# Patient Record
Sex: MALE | Race: BLACK OR AFRICAN AMERICAN | Employment: FULL TIME | URBAN - METROPOLITAN AREA
[De-identification: names, ages, dates, MRNs, and addresses within clinical notes are randomized per-mention and may not be internally consistent; named-entity substitution may affect disease eponyms.]

---

## 2017-01-16 ENCOUNTER — OFFICE VISIT (OUTPATIENT)
Dept: FAMILY MEDICINE CLINIC | Age: 57
End: 2017-01-16

## 2017-01-16 VITALS
SYSTOLIC BLOOD PRESSURE: 128 MMHG | HEART RATE: 77 BPM | HEIGHT: 66 IN | WEIGHT: 315 LBS | DIASTOLIC BLOOD PRESSURE: 80 MMHG | RESPIRATION RATE: 16 BRPM | BODY MASS INDEX: 50.62 KG/M2 | OXYGEN SATURATION: 93 %

## 2017-01-16 DIAGNOSIS — R79.83 HOMOCYSTEINEMIA: ICD-10-CM

## 2017-01-16 DIAGNOSIS — J01.91 ACUTE RECURRENT SINUSITIS, UNSPECIFIED LOCATION: ICD-10-CM

## 2017-01-16 DIAGNOSIS — Z51.81 MEDICATION MONITORING ENCOUNTER: ICD-10-CM

## 2017-01-16 DIAGNOSIS — E78.2 HYPERLIPIDEMIA, MIXED: Chronic | ICD-10-CM

## 2017-01-16 DIAGNOSIS — Z23 NEED FOR PROPHYLACTIC VACCINATION AGAINST STREPTOCOCCUS PNEUMONIAE (PNEUMOCOCCUS): ICD-10-CM

## 2017-01-16 DIAGNOSIS — I10 HTN (HYPERTENSION), BENIGN: Chronic | ICD-10-CM

## 2017-01-16 LAB
ANION GAP SERPL CALCULATED.3IONS-SCNC: 18 MMOL/L (ref 3–16)
BUN BLDV-MCNC: 21 MG/DL (ref 7–20)
CALCIUM SERPL-MCNC: 10 MG/DL (ref 8.3–10.6)
CHLORIDE BLD-SCNC: 97 MMOL/L (ref 99–110)
CO2: 23 MMOL/L (ref 21–32)
CREAT SERPL-MCNC: 1.4 MG/DL (ref 0.9–1.3)
GFR AFRICAN AMERICAN: >60
GFR NON-AFRICAN AMERICAN: 52
GLUCOSE BLD-MCNC: 106 MG/DL (ref 70–99)
HBA1C MFR BLD: 8.5 %
POTASSIUM SERPL-SCNC: 4.7 MMOL/L (ref 3.5–5.1)
SODIUM BLD-SCNC: 138 MMOL/L (ref 136–145)

## 2017-01-16 PROCEDURE — 3045F PR MOST RECENT HEMOGLOBIN A1C LEVEL 7.0-9.0%: CPT | Performed by: FAMILY MEDICINE

## 2017-01-16 PROCEDURE — G8427 DOCREV CUR MEDS BY ELIG CLIN: HCPCS | Performed by: FAMILY MEDICINE

## 2017-01-16 PROCEDURE — G8484 FLU IMMUNIZE NO ADMIN: HCPCS | Performed by: FAMILY MEDICINE

## 2017-01-16 PROCEDURE — 99215 OFFICE O/P EST HI 40 MIN: CPT | Performed by: FAMILY MEDICINE

## 2017-01-16 PROCEDURE — 83036 HEMOGLOBIN GLYCOSYLATED A1C: CPT | Performed by: FAMILY MEDICINE

## 2017-01-16 PROCEDURE — 36415 COLL VENOUS BLD VENIPUNCTURE: CPT | Performed by: FAMILY MEDICINE

## 2017-01-16 PROCEDURE — 90732 PPSV23 VACC 2 YRS+ SUBQ/IM: CPT | Performed by: FAMILY MEDICINE

## 2017-01-16 PROCEDURE — 3017F COLORECTAL CA SCREEN DOC REV: CPT | Performed by: FAMILY MEDICINE

## 2017-01-16 PROCEDURE — 1036F TOBACCO NON-USER: CPT | Performed by: FAMILY MEDICINE

## 2017-01-16 PROCEDURE — G8419 CALC BMI OUT NRM PARAM NOF/U: HCPCS | Performed by: FAMILY MEDICINE

## 2017-01-16 PROCEDURE — 90471 IMMUNIZATION ADMIN: CPT | Performed by: FAMILY MEDICINE

## 2017-01-16 RX ORDER — SIMVASTATIN 80 MG
80 TABLET ORAL NIGHTLY
Qty: 90 TABLET | Refills: 0 | Status: SHIPPED | OUTPATIENT
Start: 2017-01-16 | End: 2017-06-05 | Stop reason: SDUPTHER

## 2017-01-16 RX ORDER — FOLIC ACID 1 MG/1
1 TABLET ORAL DAILY
Qty: 90 TABLET | Refills: 0 | Status: SHIPPED | OUTPATIENT
Start: 2017-01-16 | End: 2017-06-05 | Stop reason: SDUPTHER

## 2017-01-16 RX ORDER — METFORMIN HYDROCHLORIDE 750 MG/1
TABLET, EXTENDED RELEASE ORAL
Qty: 180 TABLET | Refills: 0 | Status: SHIPPED | OUTPATIENT
Start: 2017-01-16 | End: 2017-06-05 | Stop reason: SDUPTHER

## 2017-01-16 RX ORDER — TRIAMTERENE AND HYDROCHLOROTHIAZIDE 75; 50 MG/1; MG/1
TABLET ORAL
Qty: 90 TABLET | Refills: 0 | Status: CANCELLED | OUTPATIENT
Start: 2017-01-16

## 2017-01-16 RX ORDER — TRIAMTERENE AND HYDROCHLOROTHIAZIDE 37.5; 25 MG/1; MG/1
1 TABLET ORAL DAILY
Qty: 90 TABLET | Refills: 0 | Status: SHIPPED | OUTPATIENT
Start: 2017-01-16 | End: 2017-06-05 | Stop reason: SDUPTHER

## 2017-01-16 RX ORDER — LISINOPRIL 40 MG/1
TABLET ORAL
Qty: 90 TABLET | Refills: 0 | Status: SHIPPED | OUTPATIENT
Start: 2017-01-16 | End: 2017-06-05 | Stop reason: SDUPTHER

## 2017-01-16 RX ORDER — SULFAMETHOXAZOLE AND TRIMETHOPRIM 800; 160 MG/1; MG/1
1 TABLET ORAL 2 TIMES DAILY
Qty: 20 TABLET | Refills: 0 | Status: SHIPPED | OUTPATIENT
Start: 2017-01-16 | End: 2017-01-26

## 2017-01-16 ASSESSMENT — PATIENT HEALTH QUESTIONNAIRE - PHQ9
SUM OF ALL RESPONSES TO PHQ QUESTIONS 1-9: 0
2. FEELING DOWN, DEPRESSED OR HOPELESS: 0
SUM OF ALL RESPONSES TO PHQ9 QUESTIONS 1 & 2: 0
1. LITTLE INTEREST OR PLEASURE IN DOING THINGS: 0

## 2017-06-05 DIAGNOSIS — E78.2 HYPERLIPIDEMIA, MIXED: Chronic | ICD-10-CM

## 2017-06-05 DIAGNOSIS — I10 HTN (HYPERTENSION), BENIGN: Chronic | ICD-10-CM

## 2017-06-05 DIAGNOSIS — R79.83 HOMOCYSTEINEMIA: ICD-10-CM

## 2017-06-05 RX ORDER — SIMVASTATIN 80 MG
TABLET ORAL
Qty: 90 TABLET | Refills: 3 | Status: SHIPPED | OUTPATIENT
Start: 2017-06-05 | End: 2018-06-04 | Stop reason: SDUPTHER

## 2017-06-05 RX ORDER — LISINOPRIL 40 MG/1
TABLET ORAL
Qty: 30 TABLET | Refills: 0 | Status: SHIPPED | OUTPATIENT
Start: 2017-06-05 | End: 2017-06-19 | Stop reason: SDUPTHER

## 2017-06-05 RX ORDER — TRIAMTERENE AND HYDROCHLOROTHIAZIDE 37.5; 25 MG/1; MG/1
TABLET ORAL
Qty: 30 TABLET | Refills: 0 | Status: SHIPPED | OUTPATIENT
Start: 2017-06-05 | End: 2017-06-19 | Stop reason: SDUPTHER

## 2017-06-05 RX ORDER — METFORMIN HYDROCHLORIDE 750 MG/1
TABLET, EXTENDED RELEASE ORAL
Qty: 60 TABLET | Refills: 0 | Status: SHIPPED | OUTPATIENT
Start: 2017-06-05 | End: 2017-06-19 | Stop reason: SDUPTHER

## 2017-06-05 RX ORDER — FOLIC ACID 1 MG/1
TABLET ORAL
Qty: 30 TABLET | Refills: 0 | Status: SHIPPED | OUTPATIENT
Start: 2017-06-05 | End: 2017-06-19 | Stop reason: SDUPTHER

## 2017-06-19 ENCOUNTER — OFFICE VISIT (OUTPATIENT)
Dept: FAMILY MEDICINE CLINIC | Age: 57
End: 2017-06-19

## 2017-06-19 VITALS
HEIGHT: 66 IN | WEIGHT: 315 LBS | SYSTOLIC BLOOD PRESSURE: 120 MMHG | HEART RATE: 72 BPM | DIASTOLIC BLOOD PRESSURE: 74 MMHG | BODY MASS INDEX: 50.62 KG/M2 | OXYGEN SATURATION: 96 % | RESPIRATION RATE: 16 BRPM

## 2017-06-19 DIAGNOSIS — I10 HTN (HYPERTENSION), BENIGN: Primary | Chronic | ICD-10-CM

## 2017-06-19 DIAGNOSIS — E79.0 ELEVATED URIC ACID IN BLOOD: Chronic | ICD-10-CM

## 2017-06-19 DIAGNOSIS — R79.83 HOMOCYSTEINEMIA: ICD-10-CM

## 2017-06-19 DIAGNOSIS — N18.2 CHRONIC KIDNEY DISEASE, STAGE II (MILD): Chronic | ICD-10-CM

## 2017-06-19 DIAGNOSIS — Z11.4 SCREENING FOR HIV WITHOUT PRESENCE OF RISK FACTORS: ICD-10-CM

## 2017-06-19 DIAGNOSIS — R60.0 EDEMA OF BOTH LEGS: Chronic | ICD-10-CM

## 2017-06-19 DIAGNOSIS — Z11.59 NEED FOR HEPATITIS C SCREENING TEST: ICD-10-CM

## 2017-06-19 LAB
ANION GAP SERPL CALCULATED.3IONS-SCNC: 13 MMOL/L (ref 3–16)
BUN BLDV-MCNC: 23 MG/DL (ref 7–20)
CALCIUM SERPL-MCNC: 10.3 MG/DL (ref 8.3–10.6)
CHLORIDE BLD-SCNC: 100 MMOL/L (ref 99–110)
CO2: 27 MMOL/L (ref 21–32)
CREAT SERPL-MCNC: 1.2 MG/DL (ref 0.9–1.3)
GFR AFRICAN AMERICAN: >60
GFR NON-AFRICAN AMERICAN: >60
GLUCOSE BLD-MCNC: 120 MG/DL (ref 70–99)
HBA1C MFR BLD: 8 %
HEPATITIS C ANTIBODY INTERPRETATION: NORMAL
POTASSIUM SERPL-SCNC: 4.6 MMOL/L (ref 3.5–5.1)
SODIUM BLD-SCNC: 140 MMOL/L (ref 136–145)

## 2017-06-19 PROCEDURE — 99215 OFFICE O/P EST HI 40 MIN: CPT | Performed by: FAMILY MEDICINE

## 2017-06-19 PROCEDURE — G8417 CALC BMI ABV UP PARAM F/U: HCPCS | Performed by: FAMILY MEDICINE

## 2017-06-19 PROCEDURE — G8427 DOCREV CUR MEDS BY ELIG CLIN: HCPCS | Performed by: FAMILY MEDICINE

## 2017-06-19 PROCEDURE — 3046F HEMOGLOBIN A1C LEVEL >9.0%: CPT | Performed by: FAMILY MEDICINE

## 2017-06-19 PROCEDURE — 1036F TOBACCO NON-USER: CPT | Performed by: FAMILY MEDICINE

## 2017-06-19 PROCEDURE — 36415 COLL VENOUS BLD VENIPUNCTURE: CPT | Performed by: FAMILY MEDICINE

## 2017-06-19 PROCEDURE — 83036 HEMOGLOBIN GLYCOSYLATED A1C: CPT | Performed by: FAMILY MEDICINE

## 2017-06-19 PROCEDURE — 3017F COLORECTAL CA SCREEN DOC REV: CPT | Performed by: FAMILY MEDICINE

## 2017-06-19 RX ORDER — METFORMIN HYDROCHLORIDE 750 MG/1
TABLET, EXTENDED RELEASE ORAL
Qty: 180 TABLET | Refills: 3 | Status: SHIPPED | OUTPATIENT
Start: 2017-06-19 | End: 2017-09-18 | Stop reason: SDUPTHER

## 2017-06-19 RX ORDER — TRIAMTERENE AND HYDROCHLOROTHIAZIDE 37.5; 25 MG/1; MG/1
TABLET ORAL
Qty: 90 TABLET | Refills: 0 | Status: SHIPPED | OUTPATIENT
Start: 2017-06-19 | End: 2017-11-01 | Stop reason: SDUPTHER

## 2017-06-19 RX ORDER — FOLIC ACID 1 MG/1
TABLET ORAL
Qty: 90 TABLET | Refills: 3 | Status: SHIPPED | OUTPATIENT
Start: 2017-06-19 | End: 2018-06-04 | Stop reason: SDUPTHER

## 2017-06-19 RX ORDER — LISINOPRIL 40 MG/1
TABLET ORAL
Qty: 90 TABLET | Refills: 0 | Status: SHIPPED | OUTPATIENT
Start: 2017-06-19 | End: 2017-07-10 | Stop reason: SDUPTHER

## 2017-06-19 ASSESSMENT — ENCOUNTER SYMPTOMS
CHEST TIGHTNESS: 0
WHEEZING: 0
SHORTNESS OF BREATH: 0
APNEA: 1
CHOKING: 0
COUGH: 1

## 2017-06-20 LAB — HIV-1 AND HIV-2 ANTIBODIES: NORMAL

## 2017-07-10 DIAGNOSIS — I10 HTN (HYPERTENSION), BENIGN: Chronic | ICD-10-CM

## 2017-07-10 RX ORDER — LISINOPRIL 40 MG/1
TABLET ORAL
Qty: 90 TABLET | Refills: 0 | Status: SHIPPED | OUTPATIENT
Start: 2017-07-10 | End: 2017-11-01 | Stop reason: SDUPTHER

## 2017-09-18 ENCOUNTER — OFFICE VISIT (OUTPATIENT)
Dept: FAMILY MEDICINE CLINIC | Age: 57
End: 2017-09-18

## 2017-09-18 ENCOUNTER — TELEPHONE (OUTPATIENT)
Dept: FAMILY MEDICINE CLINIC | Age: 57
End: 2017-09-18

## 2017-09-18 VITALS
BODY MASS INDEX: 50.62 KG/M2 | DIASTOLIC BLOOD PRESSURE: 76 MMHG | WEIGHT: 315 LBS | HEIGHT: 66 IN | HEART RATE: 74 BPM | OXYGEN SATURATION: 98 % | RESPIRATION RATE: 16 BRPM | SYSTOLIC BLOOD PRESSURE: 128 MMHG

## 2017-09-18 DIAGNOSIS — I10 HTN (HYPERTENSION), BENIGN: Chronic | ICD-10-CM

## 2017-09-18 DIAGNOSIS — G47.33 OSA ON CPAP: Chronic | ICD-10-CM

## 2017-09-18 DIAGNOSIS — N18.2 CHRONIC KIDNEY DISEASE, STAGE II (MILD): Chronic | ICD-10-CM

## 2017-09-18 DIAGNOSIS — E78.2 HYPERLIPIDEMIA, MIXED: Chronic | ICD-10-CM

## 2017-09-18 DIAGNOSIS — Z23 NEED FOR DIPHTHERIA-TETANUS-PERTUSSIS (TDAP) VACCINE, ADULT/ADOLESCENT: ICD-10-CM

## 2017-09-18 DIAGNOSIS — E55.9 VITAMIN D DEFICIENCY: ICD-10-CM

## 2017-09-18 DIAGNOSIS — Z51.81 MEDICATION MONITORING ENCOUNTER: ICD-10-CM

## 2017-09-18 DIAGNOSIS — Z99.89 OSA ON CPAP: Chronic | ICD-10-CM

## 2017-09-18 LAB
A/G RATIO: 1.4 (ref 1.1–2.2)
ALBUMIN SERPL-MCNC: 4.2 G/DL (ref 3.4–5)
ALP BLD-CCNC: 62 U/L (ref 40–129)
ALT SERPL-CCNC: 29 U/L (ref 10–40)
ANION GAP SERPL CALCULATED.3IONS-SCNC: 13 MMOL/L (ref 3–16)
AST SERPL-CCNC: 23 U/L (ref 15–37)
BILIRUB SERPL-MCNC: 0.5 MG/DL (ref 0–1)
BUN BLDV-MCNC: 19 MG/DL (ref 7–20)
CALCIUM SERPL-MCNC: 10 MG/DL (ref 8.3–10.6)
CHLORIDE BLD-SCNC: 100 MMOL/L (ref 99–110)
CHOLESTEROL, TOTAL: 156 MG/DL (ref 0–199)
CO2: 28 MMOL/L (ref 21–32)
CREAT SERPL-MCNC: 1.2 MG/DL (ref 0.9–1.3)
CREATININE URINE: 62.8 MG/DL (ref 39–259)
GFR AFRICAN AMERICAN: >60
GFR NON-AFRICAN AMERICAN: >60
GLOBULIN: 2.9 G/DL
GLUCOSE BLD-MCNC: 112 MG/DL (ref 70–99)
HBA1C MFR BLD: 8.4 %
HDLC SERPL-MCNC: 46 MG/DL (ref 40–60)
HOMOCYSTEINE: 15 UMOL/L (ref 0–10)
LDL CHOLESTEROL CALCULATED: 70 MG/DL
MAGNESIUM: 2.5 MG/DL (ref 1.8–2.4)
MICROALBUMIN UR-MCNC: 9 MG/DL
MICROALBUMIN/CREAT UR-RTO: 143.3 MG/G (ref 0–30)
POTASSIUM SERPL-SCNC: 4.4 MMOL/L (ref 3.5–5.1)
SODIUM BLD-SCNC: 141 MMOL/L (ref 136–145)
TOTAL PROTEIN: 7.1 G/DL (ref 6.4–8.2)
TRIGL SERPL-MCNC: 199 MG/DL (ref 0–150)
URIC ACID, SERUM: 9.2 MG/DL (ref 3.5–7.2)
VITAMIN D 25-HYDROXY: 53 NG/ML
VLDLC SERPL CALC-MCNC: 40 MG/DL

## 2017-09-18 PROCEDURE — 1036F TOBACCO NON-USER: CPT | Performed by: FAMILY MEDICINE

## 2017-09-18 PROCEDURE — G8427 DOCREV CUR MEDS BY ELIG CLIN: HCPCS | Performed by: FAMILY MEDICINE

## 2017-09-18 PROCEDURE — 3017F COLORECTAL CA SCREEN DOC REV: CPT | Performed by: FAMILY MEDICINE

## 2017-09-18 PROCEDURE — 99215 OFFICE O/P EST HI 40 MIN: CPT | Performed by: FAMILY MEDICINE

## 2017-09-18 PROCEDURE — 36415 COLL VENOUS BLD VENIPUNCTURE: CPT | Performed by: FAMILY MEDICINE

## 2017-09-18 PROCEDURE — G8417 CALC BMI ABV UP PARAM F/U: HCPCS | Performed by: FAMILY MEDICINE

## 2017-09-18 PROCEDURE — 90715 TDAP VACCINE 7 YRS/> IM: CPT | Performed by: FAMILY MEDICINE

## 2017-09-18 PROCEDURE — 3046F HEMOGLOBIN A1C LEVEL >9.0%: CPT | Performed by: FAMILY MEDICINE

## 2017-09-18 PROCEDURE — 90471 IMMUNIZATION ADMIN: CPT | Performed by: FAMILY MEDICINE

## 2017-09-18 PROCEDURE — 83036 HEMOGLOBIN GLYCOSYLATED A1C: CPT | Performed by: FAMILY MEDICINE

## 2017-09-18 RX ORDER — METFORMIN HYDROCHLORIDE 750 MG/1
TABLET, EXTENDED RELEASE ORAL
Qty: 270 TABLET | Refills: 0 | Status: SHIPPED | OUTPATIENT
Start: 2017-09-18 | End: 2017-12-19 | Stop reason: SDUPTHER

## 2017-09-18 ASSESSMENT — ENCOUNTER SYMPTOMS
COUGH: 0
CHOKING: 0
WHEEZING: 0
APNEA: 1
SHORTNESS OF BREATH: 0
CHEST TIGHTNESS: 0

## 2017-11-01 DIAGNOSIS — I10 HTN (HYPERTENSION), BENIGN: Chronic | ICD-10-CM

## 2017-11-01 RX ORDER — LISINOPRIL 40 MG/1
TABLET ORAL
Qty: 90 TABLET | Refills: 0 | Status: SHIPPED | OUTPATIENT
Start: 2017-11-01 | End: 2018-01-29 | Stop reason: SDUPTHER

## 2017-11-01 RX ORDER — TRIAMTERENE AND HYDROCHLOROTHIAZIDE 37.5; 25 MG/1; MG/1
TABLET ORAL
Qty: 90 TABLET | Refills: 0 | Status: SHIPPED | OUTPATIENT
Start: 2017-11-01 | End: 2018-01-29 | Stop reason: SDUPTHER

## 2017-12-18 RX ORDER — DAPAGLIFLOZIN 10 MG/1
TABLET, FILM COATED ORAL
Qty: 30 TABLET | Refills: 0 | Status: SHIPPED | OUTPATIENT
Start: 2017-12-18 | End: 2018-01-31 | Stop reason: SDUPTHER

## 2017-12-19 RX ORDER — METFORMIN HYDROCHLORIDE 750 MG/1
TABLET, EXTENDED RELEASE ORAL
Qty: 270 TABLET | Refills: 0 | Status: SHIPPED | OUTPATIENT
Start: 2017-12-19 | End: 2018-02-05 | Stop reason: SDUPTHER

## 2018-01-29 ENCOUNTER — TELEPHONE (OUTPATIENT)
Dept: FAMILY MEDICINE CLINIC | Age: 58
End: 2018-01-29

## 2018-01-29 DIAGNOSIS — I10 HTN (HYPERTENSION), BENIGN: Chronic | ICD-10-CM

## 2018-02-01 RX ORDER — TRIAMTERENE AND HYDROCHLOROTHIAZIDE 37.5; 25 MG/1; MG/1
TABLET ORAL
Qty: 30 TABLET | Refills: 0 | Status: SHIPPED | OUTPATIENT
Start: 2018-02-01 | End: 2018-02-05 | Stop reason: SDUPTHER

## 2018-02-01 RX ORDER — LISINOPRIL 40 MG/1
TABLET ORAL
Qty: 30 TABLET | Refills: 0 | Status: SHIPPED | OUTPATIENT
Start: 2018-02-01 | End: 2018-02-05 | Stop reason: SDUPTHER

## 2018-02-05 ENCOUNTER — OFFICE VISIT (OUTPATIENT)
Dept: FAMILY MEDICINE CLINIC | Age: 58
End: 2018-02-05

## 2018-02-05 VITALS
SYSTOLIC BLOOD PRESSURE: 112 MMHG | HEART RATE: 72 BPM | HEIGHT: 66 IN | DIASTOLIC BLOOD PRESSURE: 68 MMHG | RESPIRATION RATE: 16 BRPM | OXYGEN SATURATION: 96 % | BODY MASS INDEX: 50.62 KG/M2 | WEIGHT: 315 LBS

## 2018-02-05 DIAGNOSIS — E79.0 ELEVATED URIC ACID IN BLOOD: Chronic | ICD-10-CM

## 2018-02-05 DIAGNOSIS — N18.2 CHRONIC KIDNEY DISEASE, STAGE II (MILD): Chronic | ICD-10-CM

## 2018-02-05 DIAGNOSIS — S76.011A PSOAS MUSCLE STRAIN, RIGHT, INITIAL ENCOUNTER: ICD-10-CM

## 2018-02-05 DIAGNOSIS — E78.2 HYPERLIPIDEMIA, MIXED: Chronic | ICD-10-CM

## 2018-02-05 DIAGNOSIS — I10 HTN (HYPERTENSION), BENIGN: Chronic | ICD-10-CM

## 2018-02-05 DIAGNOSIS — R80.9 MICROALBUMINURIA: Chronic | ICD-10-CM

## 2018-02-05 DIAGNOSIS — E55.9 VITAMIN D DEFICIENCY: Chronic | ICD-10-CM

## 2018-02-05 DIAGNOSIS — R79.83 HOMOCYSTEINEMIA: Chronic | ICD-10-CM

## 2018-02-05 LAB — HBA1C MFR BLD: 7.9 %

## 2018-02-05 PROCEDURE — 99215 OFFICE O/P EST HI 40 MIN: CPT | Performed by: FAMILY MEDICINE

## 2018-02-05 PROCEDURE — G8428 CUR MEDS NOT DOCUMENT: HCPCS | Performed by: FAMILY MEDICINE

## 2018-02-05 PROCEDURE — 3045F PR MOST RECENT HEMOGLOBIN A1C LEVEL 7.0-9.0%: CPT | Performed by: FAMILY MEDICINE

## 2018-02-05 PROCEDURE — 1036F TOBACCO NON-USER: CPT | Performed by: FAMILY MEDICINE

## 2018-02-05 PROCEDURE — G8484 FLU IMMUNIZE NO ADMIN: HCPCS | Performed by: FAMILY MEDICINE

## 2018-02-05 PROCEDURE — 3017F COLORECTAL CA SCREEN DOC REV: CPT | Performed by: FAMILY MEDICINE

## 2018-02-05 PROCEDURE — G8417 CALC BMI ABV UP PARAM F/U: HCPCS | Performed by: FAMILY MEDICINE

## 2018-02-05 PROCEDURE — 83036 HEMOGLOBIN GLYCOSYLATED A1C: CPT | Performed by: FAMILY MEDICINE

## 2018-02-05 RX ORDER — TRIAMTERENE AND HYDROCHLOROTHIAZIDE 37.5; 25 MG/1; MG/1
TABLET ORAL
Qty: 90 TABLET | Refills: 0 | Status: SHIPPED | OUTPATIENT
Start: 2018-02-05 | End: 2018-05-26 | Stop reason: SDUPTHER

## 2018-02-05 RX ORDER — LISINOPRIL 40 MG/1
TABLET ORAL
Qty: 90 TABLET | Refills: 0 | Status: SHIPPED | OUTPATIENT
Start: 2018-02-05 | End: 2018-05-26 | Stop reason: SDUPTHER

## 2018-02-05 RX ORDER — METFORMIN HYDROCHLORIDE 750 MG/1
TABLET, EXTENDED RELEASE ORAL
Qty: 270 TABLET | Refills: 0 | Status: SHIPPED | OUTPATIENT
Start: 2018-02-05 | End: 2018-06-04 | Stop reason: SDUPTHER

## 2018-02-05 NOTE — PROGRESS NOTES
Subjective:      Patient ID: Dino Becker is a 62 y.o. male. Chief Complaint   Patient presents with    Diabetes     4 MONTH FOLLOW UP     HPI   ED Provider Notes - JOHN Wilkinson-CNP - 01/09/2018 11:43 AM EST  CHIEF COMPLAINT   Back Pain    Hip Pain   HPI  Dino Becker is a 62 y.o. male who presents to the emergency department concern for 4 dys left lower back pain associated when lying flat & improved w/movment and left inguinal/groin burning sensation. 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 Repetitive lifting. No direct injury, illness, nausea, vomiting abd/scrotal/genital pain tender, redness or swelling. No bowel or urinary changes. No PmHx Ca or IVDA. No motor weakness. COURSE & MEDICAL DECISION MAKING  Pertinent Labs & Imaging studies reviewed. (See chart for details)  VS & NN reviewed. AA male. Morbidly obese. Left SI pain & tender w/left inguinal pain. ? Claudell Dixons. Ua, ct pelvis & abd & xray lumbar (-). Incidental umbilical hernia w/o incarceration per RAD interpretation. Plan: D/c home. Rx. Surgery referral. RTED fever, vomiting, worsening sx's/concerns. Discharge Medication List as of 1/9/2018 3:11 PM     START taking these medications   diclofenac (VOLTAREN) 75 mg enteric coated tablet Take 1 tablet by mouth 2 times daily for 5 days. 75 mg, Disp-10 tablet, R-0, Normal   orphenadrine (NORFLEX) 100 mg extended-release tablet Take 1 tablet by mouth 2 times daily for 10 days. Indications: MUSCLE [id number] mg, Disp-20 tablet, R-0, Print  Patient was given precautions and reasons to return to the ED    FINAL IMPRESSION  1. Sacroiliac pain   2. Umbilical hernia without obstruction and without gangrene     NOW  RIGHT HIP PAIN  In ER with this on 1/9/18. Had been taking Advil. Dx was right muscle strain. Given Diclofenac bid x 5 days and Norflex bid for 10 days.    Uncontrolled type 2 diabetes mellitus with complication, without test strips (FREESTYLE LITE) strip Blood sugar daily or do twice daily if high or low. 50 strip 2    folic acid (FOLVITE) 1 MG tablet TAKE 1 TABLET BY MOUTH DAILY INDICATIONS: INCREASED AMOUNT OF HOMOCYSTEINE IN THE BLOOD 90 tablet 3    simvastatin (ZOCOR) 80 MG tablet TAKE 1 TABLET BY MOUTH NIGHTLY INDICATIONS: HYPERCHOLESTEROLEMIA AND MIXED DYSLIPIDEMIA 90 tablet 3    Multiple Vitamins-Minerals (ONE-A-DAY 50 PLUS PO) Take 1 tablet by mouth.  Cholecalciferol (VITAMIN D3) 5000 UNITS CAPS Take 1 capsule by mouth daily. Indications: Vitamin D Deficiency      glucose monitoring kit (FREESTYLE) monitoring kit 1 each by Does not apply route once for 1 dose. 1 kit 0     No current facility-administered medications for this visit. Review of Systems    Objective:   Physical Exam   Constitutional: Vital signs are normal. He appears well-developed. He is cooperative. He does not appear ill. No distress. Morbidly obese. Musculoskeletal:   Foot sensory:  Right  10/10  (), left 10/10 (). DP 2/5 PT 0/5 (not palp with thick tissue).  Callus: bilateral heels left >right.  Sores None.  Onychomycosis:   Bilateral 1st, 2nd and 5th moderate proximal, fourth toes bilateral with streaks of onychomycosis that are dark near the lateral edge of nails.  Tinea pedis in moccasin distribution with moderate peeling.   Deformity: none   Neurological: He is alert. Psychiatric: He has a normal mood and affect. His speech is normal and behavior is normal. Judgment normal. Cognition and memory are normal.   Nursing note and vitals reviewed.      Vitals:    02/05/18 1624   BP: 112/68   Site: Right Arm   Position: Sitting   Cuff Size: Medium Adult   Pulse: 72   Resp: 16   SpO2: 96%   Weight: (!) 348 lb 6.4 oz (158 kg)   Height: 5' 6\" (1.676 m)     BP Readings from Last 3 Encounters:   02/05/18 112/68   09/18/17 128/76   06/19/17 120/74     Pulse Readings from Last 3 Encounters:   02/05/18 72   09/18/17 74   06/19/17 72     Wt normal osseous density and configuration. No evidence of acute fracture or compression deformity. No evidence of alignment abnormality. No significant disc space narrowing. No evidence of osteolytic process. IMPRESSION:   No evidence of acute lumbar fracture or malalignment. No significant degenerative disc space narrowing. Electronically Signed by: Nadir Antoine MD, 1/9/2018 12:36 PM    CT ABDOMEN AND PELVIS  INDICATION:      Left lower quadrant abdominal pain. 1/9/2018 2:33 PM  CT ABDOMEN:  Imaging through the inferior portion of the chest demonstrates no evidence of acute airspace   disease. Visualized portion of the heart appears normal in size and configuration. No   pericardial thickening identified. The liver is normal in size and density. No pathologic biliary distention visualized. The spleen is normal in size and attenuation. The   stomach is normal in configuration. The pancreas is normal in size and density. The adrenal   glands are unremarkable. The kidneys are normal in size. No   evidence of renal calculus or hydronephrosis. The aorta and inferior vena cava appear normal in  [facility] Intestinal loops in the pelvis appear normal in diameter. No pathologic intestinal   wall thickening is seen. Supraumbilical hernia is identified with   transverse defect measuring 2.6 cm. The hernia sac contains loops of small intestine that do   not appear inflamed or distended. CT PELVIS:  Umbilical hernia identified with transverse defect measuring 4.6 cm. The hernia sac contains   small intestinal loops that do not appear pathologically distended or inflamed. Pelvic   intestinal loops appear normal in diameter. No pelvic mass or free fluid   seen. Urinary bladder is smoothly marginated. No evidence of inguinal hernia. No evidence of   osteolytic process.     [IMPRESSION]  Supraumbilical and umbilical hernias that both contain loops of small intestine without   associated obstruction or

## 2018-02-05 NOTE — PATIENT INSTRUCTIONS
your neck (not your head), with your elbows spread apart. 3. Slowly tighten your belly muscles and raise your shoulder blades off the floor. 4. Keep your head in line with your body, and do not press your chin to your chest.  5. Hold this position for 1 or 2 seconds, then slowly lower yourself back down to the floor. 6. Repeat 8 to 12 times. Pelvic tilt exercise    1. Lie on your back with your knees bent. 2. \"Brace\" your stomach. This means to tighten your muscles by pulling in and imagining your belly button moving toward your spine. You should feel like your back is pressing to the floor and your hips and pelvis are rocking back. 3. Hold for about 6 seconds while you breathe smoothly. 4. Repeat 8 to 12 times. Heel dig bridging    1. Lie on your back with both knees bent and your ankles bent so that only your heels are digging into the floor. Your knees should be bent about 90 degrees. 2. Then push your heels into the floor, squeeze your buttocks, and lift your hips off the floor until your shoulders, hips, and knees are all in a straight line. 3. Hold for about 6 seconds as you continue to breathe normally, and then slowly lower your hips back down to the floor and rest for up to 10 seconds. 4. Do 8 to 12 repetitions. Hamstring stretch in doorway    1. Lie on your back in a doorway, with one leg through the open door. 2. Slide your leg up the wall to straighten your knee. You should feel a gentle stretch down the back of your leg. 3. Hold the stretch for at least 15 to 30 seconds. Do not arch your back, point your toes, or bend either knee. Keep one heel touching the floor and the other heel touching the wall. 4. Repeat with your other leg. 5. Do 2 to 4 times for each leg. Hip flexor stretch    1. Kneel on the floor with one knee bent and one leg behind you. Place your forward knee over your foot. Keep your other knee touching the floor.   2. Slowly push your hips forward until you feel a stretch

## 2018-02-20 ENCOUNTER — HOSPITAL ENCOUNTER (OUTPATIENT)
Dept: OTHER | Age: 58
Discharge: HOME | End: 2018-02-20
Attending: FAMILY MEDICINE | Admitting: FAMILY MEDICINE

## 2018-02-20 DIAGNOSIS — N18.2 CHRONIC KIDNEY DISEASE, STAGE II (MILD): Chronic | ICD-10-CM

## 2018-02-20 DIAGNOSIS — I10 HTN (HYPERTENSION), BENIGN: Chronic | ICD-10-CM

## 2018-02-20 LAB
ANION GAP SERPL CALCULATED.3IONS-SCNC: 14 MMOL/L (ref 3–16)
BUN BLDV-MCNC: 24 MG/DL (ref 7–20)
CALCIUM SERPL-MCNC: 10.1 MG/DL (ref 8.3–10.6)
CHLORIDE BLD-SCNC: 101 MMOL/L (ref 99–110)
CO2: 26 MMOL/L (ref 21–32)
CREAT SERPL-MCNC: 1.3 MG/DL (ref 0.9–1.3)
GFR AFRICAN AMERICAN: >60
GFR NON-AFRICAN AMERICAN: 57
GLUCOSE BLD-MCNC: 113 MG/DL (ref 70–99)
POTASSIUM SERPL-SCNC: 4.1 MMOL/L (ref 3.5–5.1)
SODIUM BLD-SCNC: 141 MMOL/L (ref 136–145)

## 2018-04-18 PROBLEM — M16.0 PRIMARY OSTEOARTHRITIS OF BOTH HIPS: Chronic | Status: ACTIVE | Noted: 2018-03-19

## 2018-04-24 RX ORDER — DAPAGLIFLOZIN 10 MG/1
TABLET, FILM COATED ORAL
Qty: 30 TABLET | Refills: 0 | Status: SHIPPED | OUTPATIENT
Start: 2018-04-24 | End: 2018-05-26 | Stop reason: SDUPTHER

## 2018-05-07 ENCOUNTER — TELEPHONE (OUTPATIENT)
Dept: FAMILY MEDICINE CLINIC | Age: 58
End: 2018-05-07

## 2018-05-26 DIAGNOSIS — I10 HTN (HYPERTENSION), BENIGN: Chronic | ICD-10-CM

## 2018-05-29 RX ORDER — TRIAMTERENE AND HYDROCHLOROTHIAZIDE 37.5; 25 MG/1; MG/1
TABLET ORAL
Qty: 30 TABLET | Refills: 0 | Status: SHIPPED | OUTPATIENT
Start: 2018-05-29 | End: 2018-06-04 | Stop reason: SDUPTHER

## 2018-05-29 RX ORDER — DAPAGLIFLOZIN 10 MG/1
TABLET, FILM COATED ORAL
Qty: 30 TABLET | Refills: 0 | Status: SHIPPED | OUTPATIENT
Start: 2018-05-29 | End: 2018-06-04 | Stop reason: SDUPTHER

## 2018-05-29 RX ORDER — LISINOPRIL 40 MG/1
TABLET ORAL
Qty: 30 TABLET | Refills: 0 | Status: SHIPPED | OUTPATIENT
Start: 2018-05-29 | End: 2018-06-04 | Stop reason: SDUPTHER

## 2018-06-04 ENCOUNTER — OFFICE VISIT (OUTPATIENT)
Dept: FAMILY MEDICINE CLINIC | Age: 58
End: 2018-06-04

## 2018-06-04 VITALS
SYSTOLIC BLOOD PRESSURE: 120 MMHG | BODY MASS INDEX: 50.62 KG/M2 | HEART RATE: 76 BPM | RESPIRATION RATE: 24 BRPM | DIASTOLIC BLOOD PRESSURE: 76 MMHG | WEIGHT: 315 LBS | OXYGEN SATURATION: 97 % | HEIGHT: 66 IN

## 2018-06-04 DIAGNOSIS — E79.0 ELEVATED URIC ACID IN BLOOD: ICD-10-CM

## 2018-06-04 DIAGNOSIS — E78.2 HYPERLIPIDEMIA, MIXED: Chronic | ICD-10-CM

## 2018-06-04 DIAGNOSIS — I10 HTN (HYPERTENSION), BENIGN: Chronic | ICD-10-CM

## 2018-06-04 DIAGNOSIS — R79.83 HOMOCYSTEINEMIA: ICD-10-CM

## 2018-06-04 DIAGNOSIS — M51.36 DEGENERATIVE DISC DISEASE, LUMBAR: Chronic | ICD-10-CM

## 2018-06-04 LAB
ANION GAP SERPL CALCULATED.3IONS-SCNC: 20 MMOL/L (ref 3–16)
BUN BLDV-MCNC: 24 MG/DL (ref 7–20)
CALCIUM SERPL-MCNC: 10 MG/DL (ref 8.3–10.6)
CHLORIDE BLD-SCNC: 100 MMOL/L (ref 99–110)
CO2: 26 MMOL/L (ref 21–32)
CREAT SERPL-MCNC: 1.3 MG/DL (ref 0.9–1.3)
GFR AFRICAN AMERICAN: >60
GFR NON-AFRICAN AMERICAN: 57
GLUCOSE BLD-MCNC: 130 MG/DL (ref 70–99)
HBA1C MFR BLD: 6.3 %
POTASSIUM SERPL-SCNC: 4.4 MMOL/L (ref 3.5–5.1)
SODIUM BLD-SCNC: 146 MMOL/L (ref 136–145)
URIC ACID, SERUM: 8.7 MG/DL (ref 3.5–7.2)

## 2018-06-04 PROCEDURE — 2022F DILAT RTA XM EVC RTNOPTHY: CPT | Performed by: FAMILY MEDICINE

## 2018-06-04 PROCEDURE — G8417 CALC BMI ABV UP PARAM F/U: HCPCS | Performed by: FAMILY MEDICINE

## 2018-06-04 PROCEDURE — G8427 DOCREV CUR MEDS BY ELIG CLIN: HCPCS | Performed by: FAMILY MEDICINE

## 2018-06-04 PROCEDURE — 99214 OFFICE O/P EST MOD 30 MIN: CPT | Performed by: FAMILY MEDICINE

## 2018-06-04 PROCEDURE — 83036 HEMOGLOBIN GLYCOSYLATED A1C: CPT | Performed by: FAMILY MEDICINE

## 2018-06-04 PROCEDURE — 36415 COLL VENOUS BLD VENIPUNCTURE: CPT | Performed by: FAMILY MEDICINE

## 2018-06-04 PROCEDURE — 3044F HG A1C LEVEL LT 7.0%: CPT | Performed by: FAMILY MEDICINE

## 2018-06-04 PROCEDURE — 3017F COLORECTAL CA SCREEN DOC REV: CPT | Performed by: FAMILY MEDICINE

## 2018-06-04 PROCEDURE — 1036F TOBACCO NON-USER: CPT | Performed by: FAMILY MEDICINE

## 2018-06-04 RX ORDER — LISINOPRIL 40 MG/1
TABLET ORAL
Qty: 90 TABLET | Refills: 0 | Status: SHIPPED | OUTPATIENT
Start: 2018-06-04 | End: 2018-10-04 | Stop reason: SDUPTHER

## 2018-06-04 RX ORDER — METFORMIN HYDROCHLORIDE 750 MG/1
TABLET, EXTENDED RELEASE ORAL
Qty: 270 TABLET | Refills: 0 | Status: SHIPPED | OUTPATIENT
Start: 2018-06-04 | End: 2019-04-01 | Stop reason: SDUPTHER

## 2018-06-04 RX ORDER — TRIAMTERENE AND HYDROCHLOROTHIAZIDE 37.5; 25 MG/1; MG/1
TABLET ORAL
Qty: 90 TABLET | Refills: 1 | Status: SHIPPED | OUTPATIENT
Start: 2018-06-04 | End: 2019-01-17 | Stop reason: SDUPTHER

## 2018-06-04 RX ORDER — SIMVASTATIN 80 MG
TABLET ORAL
Qty: 90 TABLET | Refills: 1 | Status: SHIPPED | OUTPATIENT
Start: 2018-06-04 | End: 2018-09-24 | Stop reason: SDUPTHER

## 2018-06-04 RX ORDER — FOLIC ACID 1 MG/1
TABLET ORAL
Qty: 90 TABLET | Refills: 3 | Status: SHIPPED | OUTPATIENT
Start: 2018-06-04 | End: 2019-04-01 | Stop reason: SDUPTHER

## 2018-06-04 ASSESSMENT — PATIENT HEALTH QUESTIONNAIRE - PHQ9
2. FEELING DOWN, DEPRESSED OR HOPELESS: 0
SUM OF ALL RESPONSES TO PHQ9 QUESTIONS 1 & 2: 0
SUM OF ALL RESPONSES TO PHQ QUESTIONS 1-9: 0
1. LITTLE INTEREST OR PLEASURE IN DOING THINGS: 0

## 2018-06-04 ASSESSMENT — ENCOUNTER SYMPTOMS
WHEEZING: 0
COUGH: 0
CHOKING: 0
SHORTNESS OF BREATH: 0
APNEA: 1
CHEST TIGHTNESS: 0

## 2018-06-10 PROBLEM — M51.36 DEGENERATIVE DISC DISEASE, LUMBAR: Chronic | Status: ACTIVE | Noted: 2018-05-07

## 2018-09-17 ENCOUNTER — OFFICE VISIT (OUTPATIENT)
Dept: FAMILY MEDICINE CLINIC | Age: 58
End: 2018-09-17

## 2018-09-17 VITALS
HEART RATE: 91 BPM | HEIGHT: 66 IN | SYSTOLIC BLOOD PRESSURE: 124 MMHG | RESPIRATION RATE: 20 BRPM | BODY MASS INDEX: 50.62 KG/M2 | OXYGEN SATURATION: 96 % | WEIGHT: 315 LBS | DIASTOLIC BLOOD PRESSURE: 78 MMHG

## 2018-09-17 DIAGNOSIS — Z12.5 PROSTATE CANCER SCREENING: ICD-10-CM

## 2018-09-17 DIAGNOSIS — E79.0 ELEVATED URIC ACID IN BLOOD: Chronic | ICD-10-CM

## 2018-09-17 DIAGNOSIS — Z51.81 MEDICATION MONITORING ENCOUNTER: ICD-10-CM

## 2018-09-17 DIAGNOSIS — R80.9 MICROALBUMINURIA: Chronic | ICD-10-CM

## 2018-09-17 DIAGNOSIS — I10 HTN (HYPERTENSION), BENIGN: Chronic | ICD-10-CM

## 2018-09-17 DIAGNOSIS — M51.36 DEGENERATIVE DISC DISEASE, LUMBAR: Chronic | ICD-10-CM

## 2018-09-17 DIAGNOSIS — Z12.11 SCREENING FOR COLON CANCER: ICD-10-CM

## 2018-09-17 DIAGNOSIS — Z99.89 OSA ON CPAP: Chronic | ICD-10-CM

## 2018-09-17 DIAGNOSIS — E78.2 HYPERLIPIDEMIA, MIXED: Chronic | ICD-10-CM

## 2018-09-17 DIAGNOSIS — G47.33 OSA ON CPAP: Chronic | ICD-10-CM

## 2018-09-17 DIAGNOSIS — M16.0 PRIMARY OSTEOARTHRITIS OF BOTH HIPS: Chronic | ICD-10-CM

## 2018-09-17 DIAGNOSIS — N18.2 CHRONIC KIDNEY DISEASE, STAGE II (MILD): Chronic | ICD-10-CM

## 2018-09-17 LAB — HBA1C MFR BLD: 7 %

## 2018-09-17 PROCEDURE — 3017F COLORECTAL CA SCREEN DOC REV: CPT | Performed by: FAMILY MEDICINE

## 2018-09-17 PROCEDURE — 1036F TOBACCO NON-USER: CPT | Performed by: FAMILY MEDICINE

## 2018-09-17 PROCEDURE — 99215 OFFICE O/P EST HI 40 MIN: CPT | Performed by: FAMILY MEDICINE

## 2018-09-17 PROCEDURE — 3045F PR MOST RECENT HEMOGLOBIN A1C LEVEL 7.0-9.0%: CPT | Performed by: FAMILY MEDICINE

## 2018-09-17 PROCEDURE — 2022F DILAT RTA XM EVC RTNOPTHY: CPT | Performed by: FAMILY MEDICINE

## 2018-09-17 PROCEDURE — 83036 HEMOGLOBIN GLYCOSYLATED A1C: CPT | Performed by: FAMILY MEDICINE

## 2018-09-17 PROCEDURE — G8427 DOCREV CUR MEDS BY ELIG CLIN: HCPCS | Performed by: FAMILY MEDICINE

## 2018-09-17 PROCEDURE — G8417 CALC BMI ABV UP PARAM F/U: HCPCS | Performed by: FAMILY MEDICINE

## 2018-09-17 RX ORDER — DICLOFENAC SODIUM 75 MG/1
TABLET, DELAYED RELEASE ORAL
COMMUNITY
Start: 2018-07-10 | End: 2019-04-01 | Stop reason: SDUPTHER

## 2018-09-17 NOTE — PROGRESS NOTES
Subjective:      Patient ID: Rios Hansen is a 62 y.o. male. Chief Complaint   Patient presents with    Diabetes     6 MONTH ROUTINE FOLLOW UP     HPI    Uncontrolled type 2 diabetes mellitus without complication, without long-term current use of insulin ([facility])  -     POCT glycosylated hemoglobin (Hb A1C)  Not checking BS. Was eating terrible in [place]. In his truck he had cut out his sweets. Now has a bag of pork rinds. He has no lunch bag/cooler. HTN (hypertension), benign  Is watching salt. He has not been eating in his truck. 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 Eats once or twice daily. Hyperlipidemia, mixed  Has been watching cholesterol except trip to [facility]. Foods high = eggs,    Bread, starches. Better types of meat; pork chops, sea food, poultry. Chronic kidney disease, stage II (mild)  Water:  gallons/day. BEN on CPAP  Using but has ticking in his ear. Microalbuminuria  Pt not aware. Elevated uric acid in blood  No gout sx. Degenerative disc disease, lumbar  Back pain is better so he can exercise some. He has membership at [employer]. 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 Primary osteoarthritis of both hips  Doing better. Screening for colon cancer  -     Marah Nú[facility]ez MD ([facility])  Willing to do. Prostate cancer screening  Brother  prostate cancer. Current Outpatient Prescriptions   Medication Sig Dispense Refill    diclofenac (VOLTAREN) 75 MG EC tablet TAKE 1 TABLET BY MOUTH TWICE A DAY      dapagliflozin (FARXIGA) 10 MG tablet 1 TABLET DAILY WITH FOOD. 90 tablet 0    triamterene-hydrochlorothiazide (MAXZIDE-25) 37.5-25 MG per tablet TAKE 1 TABLET BY MOUTH DAILY 90 tablet 1    lisinopril (PRINIVIL;ZESTRIL) 40 MG tablet TAKE 1 TABLET BY MOUTH EVERY DAY 90 tablet 0    metFORMIN (GLUCOPHAGE-XR) 750 MG extended release tablet TAKE 2 TABLETS BY MOUTH WITH BREAKFAST & 1 WITH DINNER.  INDICATIONS: TYPE 2 DIABETES 270 12. Medication monitoring encounter          Plan:        Daphne Kawasaki was seen today for diabetes. Diagnoses and all orders for this visit:    Uncontrolled type 2 diabetes mellitus without complication, without long-term current use of insulin (HCC)  -     POCT glycosylated hemoglobin (Hb A1C)  -     COMPREHENSIVE METABOLIC PANEL; Future  -     MICROALBUMIN / CREATININE URINE RATIO; Future  -     Poor control. Apps:  ADA blood sugar mark. \"My Fitness Pal\". HTN (hypertension), benign  -     COMPREHENSIVE METABOLIC PANEL; Future  -     MICROALBUMIN / CREATININE URINE RATIO; Future  -     Good control.  -     Continue meds and lifestyle control. APPs:  AHA blood pressure mark. Hyperlipidemia, mixed  -     LIPID PANEL; Future  -     COMPREHENSIVE METABOLIC PANEL; Future  -     HOMOCYSTEINE, SERUM; Future  Triglycerides are high. The diet to decrease triglycerides is:  Avoid sweets and soft drinks containing sugar, limit starches/carbs to the amount of calories you are burning, avoid fatty foods and added fats such as mayonnaise, salad dressings with oil, gravies and fried foods. If you drink alcohol limit to 1 -2 drinks per day. Both exercise and weight loss help also. These can also help raise the HDL to the goal of 40. Chronic kidney disease, stage II (mild)  -     MICROALBUMIN / CREATININE URINE RATIO; Future  Good diabetes control helps. BEN on CPAP  Make sure no pollen on mask. Microalbuminuria  -     MICROALBUMIN / CREATININE URINE RATIO; Future    Elevated uric acid in blood  -     URIC ACID; Future  Avoid High Purine Foods  Because uric acid is formed from the breakdown of purines, high-purine foods can trigger attacks. It is strongly encouraged to avoid:  Beer and grain liquors  Red meat, lamb and pork  Organ meats, such as liver, kidneys and sweetbreads  Seafood, especially shellfish, like shrimp, lobster, mussels, anchovies and sardines  .  Limit dietary fructose and regular

## 2018-09-18 LAB
CREATININE URINE: 56.5 MG/DL (ref 39–259)
MICROALBUMIN UR-MCNC: 5.2 MG/DL
MICROALBUMIN/CREAT UR-RTO: 92 MG/G (ref 0–30)

## 2018-10-04 DIAGNOSIS — I10 HTN (HYPERTENSION), BENIGN: Chronic | ICD-10-CM

## 2018-10-04 RX ORDER — DAPAGLIFLOZIN 10 MG/1
TABLET, FILM COATED ORAL
Qty: 90 TABLET | Refills: 0 | Status: SHIPPED | OUTPATIENT
Start: 2018-10-04 | End: 2019-04-01 | Stop reason: SDUPTHER

## 2018-10-04 RX ORDER — LISINOPRIL 40 MG/1
TABLET ORAL
Qty: 90 TABLET | Refills: 0 | Status: SHIPPED | OUTPATIENT
Start: 2018-10-04 | End: 2019-01-18 | Stop reason: SDUPTHER

## 2018-10-05 ENCOUNTER — ANESTHESIA EVENT (OUTPATIENT)
Dept: ENDOSCOPY | Age: 58
End: 2018-10-05
Payer: COMMERCIAL

## 2018-10-22 ENCOUNTER — ANESTHESIA (OUTPATIENT)
Dept: ENDOSCOPY | Age: 58
End: 2018-10-22
Payer: COMMERCIAL

## 2018-10-22 ENCOUNTER — HOSPITAL ENCOUNTER (OUTPATIENT)
Age: 58
Setting detail: OUTPATIENT SURGERY
Discharge: HOME | End: 2018-10-22
Attending: INTERNAL MEDICINE | Admitting: INTERNAL MEDICINE
Payer: COMMERCIAL

## 2018-10-22 VITALS
SYSTOLIC BLOOD PRESSURE: 133 MMHG | TEMPERATURE: 97.3 F | HEART RATE: 75 BPM | RESPIRATION RATE: 16 BRPM | WEIGHT: 315 LBS | OXYGEN SATURATION: 98 % | BODY MASS INDEX: 50.62 KG/M2 | DIASTOLIC BLOOD PRESSURE: 71 MMHG | HEIGHT: 66 IN

## 2018-10-22 VITALS
SYSTOLIC BLOOD PRESSURE: 119 MMHG | RESPIRATION RATE: 20 BRPM | DIASTOLIC BLOOD PRESSURE: 63 MMHG | OXYGEN SATURATION: 94 %

## 2018-10-22 LAB
GLUCOSE BLD-MCNC: 118 MG/DL (ref 70–99)
GLUCOSE BLD-MCNC: 95 MG/DL (ref 70–99)
PERFORMED ON: ABNORMAL
PERFORMED ON: NORMAL

## 2018-10-22 PROCEDURE — 3609010600 HC COLONOSCOPY POLYPECTOMY SNARE/COLD BIOPSY: Performed by: INTERNAL MEDICINE

## 2018-10-22 PROCEDURE — 2500000003 HC RX 250 WO HCPCS: Performed by: NURSE ANESTHETIST, CERTIFIED REGISTERED

## 2018-10-22 PROCEDURE — 2709999900 HC NON-CHARGEABLE SUPPLY: Performed by: INTERNAL MEDICINE

## 2018-10-22 PROCEDURE — 3700000000 HC ANESTHESIA ATTENDED CARE: Performed by: INTERNAL MEDICINE

## 2018-10-22 PROCEDURE — 2500000003 HC RX 250 WO HCPCS: Performed by: INTERNAL MEDICINE

## 2018-10-22 PROCEDURE — 6370000000 HC RX 637 (ALT 250 FOR IP): Performed by: INTERNAL MEDICINE

## 2018-10-22 PROCEDURE — 2580000003 HC RX 258: Performed by: ANESTHESIOLOGY

## 2018-10-22 PROCEDURE — 7100000011 HC PHASE II RECOVERY - ADDTL 15 MIN: Performed by: INTERNAL MEDICINE

## 2018-10-22 PROCEDURE — 3700000001 HC ADD 15 MINUTES (ANESTHESIA): Performed by: INTERNAL MEDICINE

## 2018-10-22 PROCEDURE — 6360000002 HC RX W HCPCS: Performed by: NURSE ANESTHETIST, CERTIFIED REGISTERED

## 2018-10-22 PROCEDURE — 7100000010 HC PHASE II RECOVERY - FIRST 15 MIN: Performed by: INTERNAL MEDICINE

## 2018-10-22 RX ORDER — PROPOFOL 10 MG/ML
INJECTION, EMULSION INTRAVENOUS PRN
Status: DISCONTINUED | OUTPATIENT
Start: 2018-10-22 | End: 2018-10-22 | Stop reason: SDUPTHER

## 2018-10-22 RX ORDER — SODIUM CHLORIDE 0.9 % (FLUSH) 0.9 %
10 SYRINGE (ML) INJECTION EVERY 12 HOURS SCHEDULED
Status: DISCONTINUED | OUTPATIENT
Start: 2018-10-22 | End: 2018-10-22 | Stop reason: HOSPADM

## 2018-10-22 RX ORDER — LIDOCAINE HYDROCHLORIDE 20 MG/ML
INJECTION, SOLUTION INFILTRATION; PERINEURAL PRN
Status: DISCONTINUED | OUTPATIENT
Start: 2018-10-22 | End: 2018-10-22 | Stop reason: SDUPTHER

## 2018-10-22 RX ORDER — ONDANSETRON 2 MG/ML
4 INJECTION INTRAMUSCULAR; INTRAVENOUS PRN
Status: DISCONTINUED | OUTPATIENT
Start: 2018-10-22 | End: 2018-10-22 | Stop reason: HOSPADM

## 2018-10-22 RX ORDER — LIDOCAINE HYDROCHLORIDE 10 MG/ML
1 INJECTION, SOLUTION EPIDURAL; INFILTRATION; INTRACAUDAL; PERINEURAL
Status: DISCONTINUED | OUTPATIENT
Start: 2018-10-22 | End: 2018-10-22 | Stop reason: HOSPADM

## 2018-10-22 RX ORDER — SODIUM CHLORIDE 9 MG/ML
INJECTION, SOLUTION INTRAVENOUS CONTINUOUS
Status: DISCONTINUED | OUTPATIENT
Start: 2018-10-22 | End: 2018-10-22 | Stop reason: HOSPADM

## 2018-10-22 RX ORDER — SODIUM CHLORIDE 0.9 % (FLUSH) 0.9 %
10 SYRINGE (ML) INJECTION PRN
Status: DISCONTINUED | OUTPATIENT
Start: 2018-10-22 | End: 2018-10-22 | Stop reason: HOSPADM

## 2018-10-22 RX ADMIN — LIDOCAINE HYDROCHLORIDE 100 MG: 20 INJECTION, SOLUTION INFILTRATION; PERINEURAL at 11:31

## 2018-10-22 RX ADMIN — SODIUM CHLORIDE: 9 INJECTION, SOLUTION INTRAVENOUS at 10:55

## 2018-10-22 RX ADMIN — PROPOFOL 200 MG: 10 INJECTION, EMULSION INTRAVENOUS at 11:31

## 2018-10-22 RX ADMIN — PROPOFOL 50 MG: 10 INJECTION, EMULSION INTRAVENOUS at 11:45

## 2018-10-22 RX ADMIN — PROPOFOL 100 MG: 10 INJECTION, EMULSION INTRAVENOUS at 11:35

## 2018-10-22 RX ADMIN — PROPOFOL 100 MG: 10 INJECTION, EMULSION INTRAVENOUS at 11:40

## 2018-10-22 ASSESSMENT — PAIN - FUNCTIONAL ASSESSMENT: PAIN_FUNCTIONAL_ASSESSMENT: 0-10

## 2018-10-22 ASSESSMENT — PAIN SCALES - GENERAL
PAINLEVEL_OUTOF10: 0

## 2018-10-22 NOTE — ANESTHESIA POSTPROCEDURE EVALUATION
Department of Anesthesiology  Postprocedure Note    Patient: Pepe Angel  MRN: [id number]  YOB: 1960  Date of evaluation: 10/22/2018  Time:  1:13 PM     Procedure Summary     Date:  10/22/18 Room / Location:  [facility] ENDO 02 / [facility] ENDOSCOPY    Anesthesia Start:  1128 Anesthesia Stop:  9734    Procedure:  COLONOSCOPY POLYPECTOMY SNARE/COLD BIOPSY (N/A ) Diagnosis:  (COLON CANCER SCREENING Z12.11)    Surgeon:  Mary Ann Poe MD Responsible Provider:  Jt Pelayo MD    Anesthesia Type:  MAC ASA Status:  3          Anesthesia Type: MAC    Thomas Phase I: Thomas Score: 10    Thomas Phase II: Thomas Score: 10    Last vitals: Reviewed and per EMR flowsheets.        Anesthesia Post Evaluation    Patient location during evaluation: PACU  Patient participation: complete - patient participated  Level of consciousness: awake and alert  Airway patency: patent  Nausea & Vomiting: no nausea and no vomiting  Complications: no  Cardiovascular status: hemodynamically stable  Respiratory status: acceptable  Hydration status: stable

## 2018-10-22 NOTE — PROGRESS NOTES
No Known Problems Paternal Grandmother     No Known Problems Paternal Grandfather     No Known Problems Daughter

## 2018-10-23 DIAGNOSIS — E78.2 HYPERLIPIDEMIA, MIXED: Chronic | ICD-10-CM

## 2018-10-23 RX ORDER — SIMVASTATIN 80 MG
TABLET ORAL
Qty: 30 TABLET | Refills: 1 | Status: SHIPPED | OUTPATIENT
Start: 2018-10-23 | End: 2019-04-01 | Stop reason: SDUPTHER

## 2019-01-18 DIAGNOSIS — I10 HTN (HYPERTENSION), BENIGN: Chronic | ICD-10-CM

## 2019-01-19 RX ORDER — LISINOPRIL 40 MG/1
TABLET ORAL
Qty: 15 TABLET | Refills: 0 | Status: SHIPPED | OUTPATIENT
Start: 2019-01-19 | End: 2019-04-01 | Stop reason: SDUPTHER

## 2019-04-01 ENCOUNTER — OFFICE VISIT (OUTPATIENT)
Dept: FAMILY MEDICINE CLINIC | Age: 59
End: 2019-04-01
Payer: COMMERCIAL

## 2019-04-01 VITALS
DIASTOLIC BLOOD PRESSURE: 86 MMHG | BODY MASS INDEX: 50.62 KG/M2 | HEART RATE: 71 BPM | RESPIRATION RATE: 16 BRPM | HEIGHT: 66 IN | OXYGEN SATURATION: 92 % | SYSTOLIC BLOOD PRESSURE: 130 MMHG | WEIGHT: 315 LBS

## 2019-04-01 DIAGNOSIS — I10 HTN (HYPERTENSION), BENIGN: Chronic | ICD-10-CM

## 2019-04-01 DIAGNOSIS — I49.9 IRREGULAR HEART BEAT: ICD-10-CM

## 2019-04-01 DIAGNOSIS — R79.83 HOMOCYSTEINEMIA: ICD-10-CM

## 2019-04-01 DIAGNOSIS — E78.2 HYPERLIPIDEMIA, MIXED: Chronic | ICD-10-CM

## 2019-04-01 DIAGNOSIS — N18.2 CHRONIC KIDNEY DISEASE, STAGE II (MILD): ICD-10-CM

## 2019-04-01 DIAGNOSIS — I49.3 FREQUENT PVCS: Chronic | ICD-10-CM

## 2019-04-01 DIAGNOSIS — E79.0 ELEVATED URIC ACID IN BLOOD: ICD-10-CM

## 2019-04-01 LAB
A/G RATIO: 1.3 (ref 1.1–2.2)
ALBUMIN SERPL-MCNC: 4 G/DL (ref 3.4–5)
ALP BLD-CCNC: 54 U/L (ref 40–129)
ALT SERPL-CCNC: 26 U/L (ref 10–40)
ANION GAP SERPL CALCULATED.3IONS-SCNC: 11 MMOL/L (ref 3–16)
AST SERPL-CCNC: 24 U/L (ref 15–37)
BILIRUB SERPL-MCNC: 0.4 MG/DL (ref 0–1)
BUN BLDV-MCNC: 20 MG/DL (ref 7–20)
CALCIUM SERPL-MCNC: 9.9 MG/DL (ref 8.3–10.6)
CHLORIDE BLD-SCNC: 104 MMOL/L (ref 99–110)
CHOLESTEROL, TOTAL: 169 MG/DL (ref 0–199)
CO2: 25 MMOL/L (ref 21–32)
CREAT SERPL-MCNC: 1.3 MG/DL (ref 0.9–1.3)
GFR AFRICAN AMERICAN: >60
GFR NON-AFRICAN AMERICAN: 57
GLOBULIN: 3.2 G/DL
GLUCOSE BLD-MCNC: 119 MG/DL (ref 70–99)
HBA1C MFR BLD: 7.5 %
HDLC SERPL-MCNC: 45 MG/DL (ref 40–60)
HOMOCYSTEINE: 16 UMOL/L (ref 0–10)
LDL CHOLESTEROL CALCULATED: 88 MG/DL
POTASSIUM SERPL-SCNC: 4.8 MMOL/L (ref 3.5–5.1)
SODIUM BLD-SCNC: 140 MMOL/L (ref 136–145)
TOTAL PROTEIN: 7.2 G/DL (ref 6.4–8.2)
TRIGL SERPL-MCNC: 180 MG/DL (ref 0–150)
URIC ACID, SERUM: 8.4 MG/DL (ref 3.5–7.2)
VLDLC SERPL CALC-MCNC: 36 MG/DL

## 2019-04-01 PROCEDURE — G8427 DOCREV CUR MEDS BY ELIG CLIN: HCPCS | Performed by: FAMILY MEDICINE

## 2019-04-01 PROCEDURE — 93000 ELECTROCARDIOGRAM COMPLETE: CPT | Performed by: FAMILY MEDICINE

## 2019-04-01 PROCEDURE — 36415 COLL VENOUS BLD VENIPUNCTURE: CPT | Performed by: FAMILY MEDICINE

## 2019-04-01 PROCEDURE — G8417 CALC BMI ABV UP PARAM F/U: HCPCS | Performed by: FAMILY MEDICINE

## 2019-04-01 PROCEDURE — 83036 HEMOGLOBIN GLYCOSYLATED A1C: CPT | Performed by: FAMILY MEDICINE

## 2019-04-01 PROCEDURE — 2022F DILAT RTA XM EVC RTNOPTHY: CPT | Performed by: FAMILY MEDICINE

## 2019-04-01 PROCEDURE — 3045F PR MOST RECENT HEMOGLOBIN A1C LEVEL 7.0-9.0%: CPT | Performed by: FAMILY MEDICINE

## 2019-04-01 PROCEDURE — 1036F TOBACCO NON-USER: CPT | Performed by: FAMILY MEDICINE

## 2019-04-01 PROCEDURE — 3017F COLORECTAL CA SCREEN DOC REV: CPT | Performed by: FAMILY MEDICINE

## 2019-04-01 PROCEDURE — 99215 OFFICE O/P EST HI 40 MIN: CPT | Performed by: FAMILY MEDICINE

## 2019-04-01 RX ORDER — SIMVASTATIN 80 MG
80 TABLET ORAL NIGHTLY
Qty: 90 TABLET | Refills: 0 | Status: SHIPPED | OUTPATIENT
Start: 2019-04-01 | End: 2019-07-08 | Stop reason: SDUPTHER

## 2019-04-01 RX ORDER — TRIAMTERENE AND HYDROCHLOROTHIAZIDE 37.5; 25 MG/1; MG/1
TABLET ORAL
Qty: 90 TABLET | Refills: 0 | Status: SHIPPED | OUTPATIENT
Start: 2019-04-01 | End: 2019-06-28 | Stop reason: SDUPTHER

## 2019-04-01 RX ORDER — METFORMIN HYDROCHLORIDE 750 MG/1
TABLET, EXTENDED RELEASE ORAL
Qty: 270 TABLET | Refills: 0 | Status: SHIPPED | OUTPATIENT
Start: 2019-04-01 | End: 2019-06-28 | Stop reason: SDUPTHER

## 2019-04-01 RX ORDER — DICLOFENAC SODIUM 75 MG/1
TABLET, DELAYED RELEASE ORAL
Qty: 180 TABLET | Refills: 0 | Status: SHIPPED | OUTPATIENT
Start: 2019-04-01 | End: 2019-11-11 | Stop reason: SDUPTHER

## 2019-04-01 RX ORDER — FOLIC ACID 1 MG/1
TABLET ORAL
Qty: 90 TABLET | Refills: 3 | Status: SHIPPED | OUTPATIENT
Start: 2019-04-01 | End: 2019-07-08 | Stop reason: SDUPTHER

## 2019-04-01 RX ORDER — LISINOPRIL 40 MG/1
TABLET ORAL
Qty: 90 TABLET | Refills: 0 | Status: SHIPPED | OUTPATIENT
Start: 2019-04-01 | End: 2019-07-03 | Stop reason: SDUPTHER

## 2019-04-01 ASSESSMENT — ENCOUNTER SYMPTOMS
CHEST TIGHTNESS: 0
WHEEZING: 0
SHORTNESS OF BREATH: 0
CHOKING: 0
COUGH: 1

## 2019-04-01 ASSESSMENT — PATIENT HEALTH QUESTIONNAIRE - PHQ9
SUM OF ALL RESPONSES TO PHQ9 QUESTIONS 1 & 2: 0
1. LITTLE INTEREST OR PLEASURE IN DOING THINGS: 0
SUM OF ALL RESPONSES TO PHQ QUESTIONS 1-9: 0
SUM OF ALL RESPONSES TO PHQ QUESTIONS 1-9: 0
2. FEELING DOWN, DEPRESSED OR HOPELESS: 0

## 2019-04-01 NOTE — PATIENT INSTRUCTIONS
Saloni Pond was seen today for diabetes and hypertension. Diagnoses and all orders for this visit:    Uncontrolled type 2 diabetes mellitus with complication, without long-term current use of insulin (HCC)  -     dapagliflozin (FARXIGA) 10 MG tablet; TAKE 1 TABLET BY MOUTH EVERY DAY WITH FOOD  -     metFORMIN (GLUCOPHAGE-XR) 750 MG extended release tablet; TAKE 2 TABLETS BY MOUTH WITH BREAKFAST & 1 WITH DINNER. INDICATIONS: TYPE 2 DIABETES  -     POCT glycosylated hemoglobin (Hb A1C)  -     Comprehensive Metabolic Panel; Future    HTN (hypertension), benign  -     lisinopril (PRINIVIL;ZESTRIL) 40 MG tablet; TAKE 1 TABLET BY MOUTH EVERY DAY  -     triamterene-hydrochlorothiazide (MAXZIDE-25) 37.5-25 MG per tablet; TAKE 1 TABLET BY MOUTH EVERY DAY  -     Comprehensive Metabolic Panel; Future  Increase work out time. Measure and record blood sugar. Make a plan to measure. Set an alarm to measure. Homocysteinemia (HCC)  -     folic acid (FOLVITE) 1 MG tablet; TAKE 1 TABLET BY MOUTH DAILY INDICATIONS: INCREASED AMOUNT OF HOMOCYSTEINE IN THE BLOOD    Hyperlipidemia, mixed  -     simvastatin (ZOCOR) 80 MG tablet; Take 1 tablet by mouth nightly  -     Lipid Panel; Future  -     Comprehensive Metabolic Panel; Future  -     Homocysteine, Serum; Future    Chronic kidney disease, stage II (mild)  -     Comprehensive Metabolic Panel; Future  -     Uric Acid; Future    Elevated uric acid in blood  -     Uric Acid; Future    Degeneration disc disease  -     diclofenac (VOLTAREN) 75 MG EC tablet; TAKE 1 TABLET BY MOUTH TWICE A DAY    Irregular heartbeat  Holter monitor. Not A fib. Patient Education        Premature Heartbeat: Care Instructions  Your Care Instructions    A premature heartbeat happens when the heart beats earlier than it should. This briefly interrupts the heart's rhythm. You do not usually feel the early heartbeat, and the next beat is stronger.  To many people, this feels like a skipped heartbeat or a flutter. This heartbeat is also called a premature ventricular contraction (PVC). If you have no heart problems, premature heartbeats that happen once in a while are not a cause for concern. Most people have them at some time. They may happen more often if you drink a lot of caffeine or alcohol or are under stress. Usually, no cause for a premature heartbeat is found, and no treatment is needed. Some people may take medicine to prevent these heartbeats and to relieve symptoms. Follow-up care is a key part of your treatment and safety. Be sure to make and go to all appointments, and call your doctor if you are having problems. It's also a good idea to know your test results and keep a list of the medicines you take. How can you care for yourself at home? · Limit caffeine and other stimulants if they trigger premature heartbeats. · Reduce stress. Avoid people and places that make you feel anxious, if you can. Learn ways to reduce stress, such as biofeedback, guided imagery, and meditation. · Do not smoke or allow others to smoke around you. If you need help quitting, talk to your doctor about stop-smoking programs and medicines. These can increase your chances of quitting for good. · Get at least 30 minutes of exercise on most days of the week. Walking is a good choice. You also may want to do other activities, such as running, swimming, cycling, or playing tennis or team sports. · Eat heart-healthy foods. · Stay at a healthy weight. Lose weight if you need to. · Get enough sleep. Keep your room dark and quiet, and try to go to bed at the same time every night. · Limit alcohol to 2 drinks a day for men and 1 drink a day for women. Too much alcohol can cause health problems. If drinking alcohol causes more premature heartbeats, do not drink it. · If your doctor prescribes medicine, take it exactly as prescribed. Call your doctor if you think you are having a problem with your medicine.   When should you call for help? Call 911 anytime you think you may need emergency care. For example, call if:    · You passed out (lost consciousness).    Call your doctor now or seek immediate medical care if:    · You are dizzy or lightheaded, or you feel like you may faint.     · You are short of breath.    Watch closely for changes in your health, and be sure to contact your doctor if you have any problems. Where can you learn more? Go to [email or web address] and sign in to your [employer] account. Enter U896 in the [employer] box to learn more about \"Premature Heartbeat: Care Instructions. \"     If you do not have an account, please click on the \"Sign Up Now\" link. Current as of: July 22, 2018  Content Version: 11.9  © 2920-6632 [employer]. Care instructions adapted under license by [facility] ([facility]). If you have questions about a medical condition or this instruction, always ask your healthcare professional. [address] any warranty or liability for your use of this information.

## 2019-04-01 NOTE — PROGRESS NOTES
Subjective:      Patient ID: Gabriel Huber is a 62 y.o. male. Chief Complaint   Patient presents with    Diabetes     PT HERE FOR ROUTINE FOLLOW ON DIABETES, A1C, PCMH AND GOAL DUE TODAY    Hypertension     PT HERE FOR ROUTINE FOLLOW UP ON HYPERTENSION, PCMH AND GOAL DUE TODAY   12  HPI    Uncontrolled type 2 diabetes mellitus with complication, without long-term current use of insulin (HCC)  -     POCT glycosylated hemoglobin (Hb A1C)  Is not recording his BS. Checks monthly. No SEs with med. HTN (hypertension), benign  Not checking bp. No home cuff. Is watching salt. Homocysteinemia ([address]  Is on folic acid 1 mg daily. Hyperlipidemia, mixed  No SE's with med. Is on vitamin D. Is watching diet. High cholesterol foods = eggs and pasta. Chronic kidney disease, stage II (mild)  Drinks 2 bottles water/day. 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 Current Outpatient Medications   Medication Sig Dispense Refill    dapagliflozin (FARXIGA) 10 MG tablet TAKE 1 TABLET BY MOUTH EVERY DAY WITH FOOD 90 tablet 0    folic acid (FOLVITE) 1 MG tablet TAKE 1 TABLET BY MOUTH DAILY INDICATIONS: INCREASED AMOUNT OF HOMOCYSTEINE IN THE BLOOD 90 tablet 3    lisinopril (PRINIVIL;ZESTRIL) 40 MG tablet TAKE 1 TABLET BY MOUTH EVERY DAY 90 tablet 0    metFORMIN (GLUCOPHAGE-XR) 750 MG extended release tablet TAKE 2 TABLETS BY MOUTH WITH BREAKFAST & 1 WITH DINNER. INDICATIONS: TYPE 2 DIABETES 270 tablet 0    simvastatin (ZOCOR) 80 MG tablet Take 1 tablet by mouth nightly 90 tablet 0    triamterene-hydrochlorothiazide (MAXZIDE-25) 37.5-25 MG per tablet TAKE 1 TABLET BY MOUTH EVERY DAY 90 tablet 0    diclofenac (VOLTAREN) 75 MG EC tablet TAKE 1 TABLET BY MOUTH TWICE A  tablet 0    glucose blood VI test strips (FREESTYLE LITE) strip Blood sugar daily or do twice daily if high or low.  50 strip 2    Multiple Vitamins-Minerals (ONE-A-DAY 50 PLUS PO) Take 1 tablet by mouth.  Cholecalciferol (VITAMIN D3) 5000 UNITS CAPS Take 1 capsule by mouth daily. Indications: Vitamin D Deficiency      glucose monitoring kit (FREESTYLE) monitoring kit 1 each by Does not apply route once for 1 dose. 1 kit 0     No current facility-administered medications for this visit. Review of Systems   Respiratory: Positive for cough (with cold). Negative for choking, chest tightness, shortness of breath and wheezing. Cardiovascular: Positive for leg swelling. Negative for chest pain and palpitations. Neurological: Negative for dizziness, light-headedness and headaches. Objective:   Physical Exam   Constitutional: Vital signs are normal. He appears well-developed. He is cooperative. He does not have a sickly appearance. He does not appear ill. No distress. Morbidly obese. Eyes: Conjunctivae are normal. Right eye exhibits no discharge. Left eye exhibits no discharge. No scleral icterus. Neck: Trachea normal and phonation normal. Neck supple. No JVD present. No tracheal tenderness present. Carotid bruit is not present. No tracheal deviation present. No thyroid mass and no thyromegaly present. Cardiovascular: Normal rate, S1 normal and S2 normal. An irregular rhythm present. Extrasystoles (frequent PVCs or PACs) are present. Exam reveals no gallop, no S3, no S4, no distant heart sounds and no friction rub. No murmur heard. No systolic murmur is present. No diastolic murmur is present. Pulmonary/Chest: Effort normal and breath sounds normal. No stridor. No respiratory distress. He has no decreased breath sounds. He has no wheezes. He has no rhonchi. He has no rales. Musculoskeletal: He exhibits edema (Firm trace to +1 pitting   ). He exhibits no tenderness. Foot sensory:  Right  10/10  (), left 10/10 (). DP 3/5 PT 0/5 (not palp with thick tissue).  Callus: bilateral heels left >right.  Sores None. Future  Only triglycerides not on goal last 2 checks. Continue medicine and lifestyle control. Chronic kidney disease, stage II (mild)  -     Comprehensive Metabolic Panel; Future  -     Uric Acid; Future  Creatinine chronically high normal.  Monitor closely due to force good. Elevated uric acid in blood  -     Uric Acid; Future  Poorly controlled in the past.  May need to use a diuretic besides triamterene - hydrochlorothiazide    Degeneration disc disease  -     diclofenac (VOLTAREN) 75 MG EC tablet; TAKE 1 TABLET BY MOUTH TWICE A DAY  Hard on kidneys. Irregular heartbeat  Holter monitor. Not A fib.     Patient Education   Premature Heartbeat: Care Instructions  Your Care Instructions      Dieter Murrieta, DO

## 2019-04-06 PROBLEM — I49.3 FREQUENT PVCS: Chronic | Status: ACTIVE | Noted: 2019-04-01

## 2019-04-15 NOTE — TELEPHONE ENCOUNTER
LAST APPT 4/1/19 WITH DR. Antonina Lee  NEXT APPT 7/8/19 WITH DR. Atnonina Lee    Hemoglobin A1C 7.5  % Final 04/01/2019 10:53 AM Unknown

## 2019-04-18 ENCOUNTER — HOSPITAL ENCOUNTER (OUTPATIENT)
Dept: NEUROLOGY | Age: 59
Discharge: HOME | End: 2019-04-18
Payer: COMMERCIAL

## 2019-04-18 DIAGNOSIS — I49.9 IRREGULAR HEART BEAT: ICD-10-CM

## 2019-04-18 PROCEDURE — 93225 XTRNL ECG REC<48 HRS REC: CPT

## 2019-04-18 PROCEDURE — 93226 XTRNL ECG REC<48 HR SCAN A/R: CPT

## 2019-04-22 LAB
ACQUISITION DURATION: NORMAL S
AVERAGE HEART RATE: 80 BPM
EKG DIAGNOSIS: NORMAL
FASTEST SUPRAVENTRICULAR RATE: 168 BPM
HOLTER MAX HEART RATE: 112 BPM
HOOKUP DATE: NORMAL
HOOKUP TIME: NORMAL
LONGEST SUPRAVENTRICULAR RATE: 153 BPM
MAX HEART RATE TIME/DATE: NORMAL
MIN HEART RATE TIME/DATE: NORMAL
MIN HEART RATE: 51 BPM
NUMBER OF FASTEST SUPRAVENTRICULAR BEATS: 3
NUMBER OF LONGEST SUPRAVENTRICULAR BEATS: 4
NUMBER OF QRS COMPLEXES: NORMAL
NUMBER OF SUPRAVENTRICULAR BEATS IN RUNS: 979
NUMBER OF SUPRAVENTRICULAR COUPLETS: 615
NUMBER OF SUPRAVENTRICULAR ECTOPICS: NORMAL
NUMBER OF SUPRAVENTRICULAR ISOLATED BEATS: NORMAL
NUMBER OF SUPRAVENTRICULAR RUNS: 315
NUMBER OF VENTRICULAR BEATS IN RUNS: 0
NUMBER OF VENTRICULAR BIGEMINAL CYCLES: 910
NUMBER OF VENTRICULAR COUPLETS: 10
NUMBER OF VENTRICULAR ECTOPICS: 9620
NUMBER OF VENTRICULAR ISOLATED BEATS: 9600
NUMBER OF VENTRICULAR RUNS: 0

## 2019-05-13 NOTE — PROGRESS NOTES
[facility]   Cardiac Consultation    Referring Provider:  Adrian Herrera DO     Chief Complaint   Patient presents with    Irregular Heart Beat     pacs and pvcs       HPI:  Armin Dawson is a 62 y.o. male with past medical history significant for type 2 diabetes, hypertension, hyperlipidemia. She presents as referral from Dr. Bernard Mckeon regarding irregular heartbeat. 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 He recalls an irregular  heart issue back then? . He has been using CPAP therapy for many years for treatment of sleep apnea. He admits to a sedentary lifestyle and works as a . He has been trying to exercise but does not participate in regular exercise. Past Medical History:   has a past medical history of Chronic kidney disease, stage II (mild), Degenerative disc disease, lumbar, DM (diabetes mellitus), type 2, uncontrolled ([address] ED (erectile dysfunction), Edema of both legs, Homocysteinemia ([address] HTN (hypertension), benign, Hyperlipidemia, mixed, Impaired fasting glucose, Microalbuminuria, Morbid obesity ([address] BEN on CPAP, Primary osteoarthritis of both hips, Umbilical hernia, and Vitamin D deficiency. Surgical History:   has a past surgical history that includes joint replacement (1975); knee surgery (2001); Finger amputation (1968); Colonoscopy (2009); and Colonoscopy (N/A, 10/22/2018). Social History:   reports that he has never smoked. He has never used smokeless tobacco. He reports that he drinks about 3.0 oz of alcohol per week. He reports that he does not use drugs.      Family History:  family history includes Coronary Art Dis in his brother; Diabetes in his brother and maternal grandmother; Heart Attack in his brother and mother; High Blood Pressure in his brother and mother; Kidney Cancer in his brother; No Known Problems in his daughter, maternal grandfather, paternal grandfather, and paternal grandmother; Prostate Cancer in his brother; Seizures in his brother. Home Medications:  Outpatient Encounter Medications as of 5/14/2019   Medication Sig Dispense Refill    dapagliflozin (FARXIGA) 10 MG tablet TAKE 1 TABLET BY MOUTH EVERY MORNING 90 tablet 0    folic acid (FOLVITE) 1 MG tablet TAKE 1 TABLET BY MOUTH DAILY INDICATIONS: INCREASED AMOUNT OF HOMOCYSTEINE IN THE BLOOD 90 tablet 3    lisinopril (PRINIVIL;ZESTRIL) 40 MG tablet TAKE 1 TABLET BY MOUTH EVERY DAY 90 tablet 0    metFORMIN (GLUCOPHAGE-XR) 750 MG extended release tablet TAKE 2 TABLETS BY MOUTH WITH BREAKFAST & 1 WITH DINNER. INDICATIONS: TYPE 2 DIABETES 270 tablet 0    simvastatin (ZOCOR) 80 MG tablet Take 1 tablet by mouth nightly 90 tablet 0    triamterene-hydrochlorothiazide (MAXZIDE-25) 37.5-25 MG per tablet TAKE 1 TABLET BY MOUTH EVERY DAY 90 tablet 0    diclofenac (VOLTAREN) 75 MG EC tablet TAKE 1 TABLET BY MOUTH TWICE A  tablet 0    glucose blood VI test strips (FREESTYLE LITE) strip Blood sugar daily or do twice daily if high or low. 50 strip 2    Multiple Vitamins-Minerals (ONE-A-DAY 50 PLUS PO) Take 1 tablet by mouth.  Cholecalciferol (VITAMIN D3) 5000 UNITS CAPS Take 1 capsule by mouth daily. Indications: Vitamin D Deficiency      glucose monitoring kit (FREESTYLE) monitoring kit 1 each by Does not apply route once for 1 dose. 1 kit 0     No facility-administered encounter medications on file as of 5/14/2019. Allergies:  Patient has no known allergies. Review of Systems   Constitutional: Negative for activity change and appetite change. HENT: Negative for facial swelling and neck pain. Eyes: Negative for discharge and itching. Respiratory: Negative for chest tightness and shortness of breath. Cardiovascular: Negative for palpitations. Negative for chest pain. Negative for leg swelling. Gastrointestinal: Negative for abdominal pain and abdominal distention. Genitourinary: Negative. Musculoskeletal: Negative.     Skin: Negative for color change and pallor. Neurological: Negative for dizziness, syncope and light-headedness. Hematological: Negative. Psychiatric/Behavioral: Negative for behavioral problems and agitation. /78 (Site: Left Upper Arm, Position: Sitting)   Pulse 80   Ht 5' 6\" (1.676 m)   Wt (!) 353 lb (160.1 kg)   SpO2 97%   BMI 56.98 kg/m²     Objective:  Physical Exam   Nursing note and vitals reviewed. Constitutional: He appears well-developed and well-nourished. obese  Head: atraumatic. Eyes: Right eye exhibits no discharge. Left eye exhibits no discharge. Neck: Neck supple. Cardiovascular: Normal rate, regular rhythm and normal heart sounds. Pulmonary/Chest: Effort normal and breath sounds normal.   Abdominal: Soft. Musculoskeletal: He exhibits no edema. Neurological: He is alert without any gross abnormalities. Skin: Skin is warm and dry. Psychiatric: He has a normal mood and affect. 24 Hour Holter monitor   1. The rhythm was sinus. Average VA interval 0.24 [1st Degree AVBlock]. Average QRS duration 0. 11. Average daily heart rate 80 rangingfrom 51 to 112 bpm. 2. Premature supraventricular ectopic beats total 19,017consisting of 16,808 isolated PACs, 615 atrial pairs and 315 atrial runs. The longest atrial run 4 beats HR-153 bpm occurring 11:57:20 day 1. The fastestatrial run 3 beats HR-168 bpm occurring 14:37:15 day 1.3. Premature ventricular ectopic beats total 9,620 consisting of 9,600 isolated PVCs and 10 ventricular couplets. 4. No symptoms noted. 8% PVCs      Assessment:  1. Frequent PVCs   Asymptomatic  8%   2. BEN (obstructive sleep apnea)  Treated with CPAP         Plan:  -Will complete an echocardiogram to rule out structural abnormalities   -Encouraged follow up with sleep medicine regarding titration of CPAP   -Encouraged weight loss and compliance with CPAP and discussed in detail.      Follow up in 3 months to see how above processed    This note was scribed in the presence of Nick King MD by General Dheeraj RN. The scribes documentation has been prepared under my direction and personally reviewed by me in its entirety. I confirm that the note above accurately reflects all work, treatment, procedures, and medical decision making performed by me. Norma Gonzalez.  Layla SOTO.

## 2019-05-14 ENCOUNTER — OFFICE VISIT (OUTPATIENT)
Dept: CARDIOLOGY CLINIC | Age: 59
End: 2019-05-14
Payer: COMMERCIAL

## 2019-05-14 VITALS
BODY MASS INDEX: 50.62 KG/M2 | SYSTOLIC BLOOD PRESSURE: 126 MMHG | DIASTOLIC BLOOD PRESSURE: 78 MMHG | WEIGHT: 315 LBS | HEIGHT: 66 IN | OXYGEN SATURATION: 97 % | HEART RATE: 80 BPM

## 2019-05-14 DIAGNOSIS — G47.33 OSA (OBSTRUCTIVE SLEEP APNEA): ICD-10-CM

## 2019-05-14 DIAGNOSIS — E66.01 CLASS 3 SEVERE OBESITY WITHOUT SERIOUS COMORBIDITY WITH BODY MASS INDEX (BMI) OF 50.0 TO 59.9 IN ADULT, UNSPECIFIED OBESITY TYPE (HCC): ICD-10-CM

## 2019-05-14 DIAGNOSIS — I49.3 FREQUENT PVCS: Primary | Chronic | ICD-10-CM

## 2019-05-14 DIAGNOSIS — I49.1 PAC (PREMATURE ATRIAL CONTRACTION): ICD-10-CM

## 2019-05-14 PROCEDURE — G8427 DOCREV CUR MEDS BY ELIG CLIN: HCPCS | Performed by: INTERNAL MEDICINE

## 2019-05-14 PROCEDURE — 3017F COLORECTAL CA SCREEN DOC REV: CPT | Performed by: INTERNAL MEDICINE

## 2019-05-14 PROCEDURE — 99203 OFFICE O/P NEW LOW 30 MIN: CPT | Performed by: INTERNAL MEDICINE

## 2019-05-14 PROCEDURE — G8417 CALC BMI ABV UP PARAM F/U: HCPCS | Performed by: INTERNAL MEDICINE

## 2019-05-14 PROCEDURE — 1036F TOBACCO NON-USER: CPT | Performed by: INTERNAL MEDICINE

## 2019-05-14 PROCEDURE — 93000 ELECTROCARDIOGRAM COMPLETE: CPT | Performed by: INTERNAL MEDICINE

## 2019-06-10 ENCOUNTER — HOSPITAL ENCOUNTER (OUTPATIENT)
Dept: NON INVASIVE DIAGNOSTICS | Age: 59
Discharge: HOME | End: 2019-06-10
Payer: COMMERCIAL

## 2019-06-10 DIAGNOSIS — I49.3 FREQUENT PVCS: Chronic | ICD-10-CM

## 2019-06-10 LAB
LV EF: 58 %
LVEF MODALITY: NORMAL

## 2019-06-10 PROCEDURE — 93306 TTE W/DOPPLER COMPLETE: CPT

## 2019-06-28 DIAGNOSIS — I10 HTN (HYPERTENSION), BENIGN: Chronic | ICD-10-CM

## 2019-06-28 RX ORDER — METFORMIN HYDROCHLORIDE 750 MG/1
TABLET, EXTENDED RELEASE ORAL
Qty: 270 TABLET | Refills: 0 | Status: SHIPPED | OUTPATIENT
Start: 2019-06-28 | End: 2019-11-11 | Stop reason: SDUPTHER

## 2019-06-28 RX ORDER — TRIAMTERENE AND HYDROCHLOROTHIAZIDE 37.5; 25 MG/1; MG/1
TABLET ORAL
Qty: 90 TABLET | Refills: 0 | Status: SHIPPED | OUTPATIENT
Start: 2019-06-28 | End: 2019-11-11 | Stop reason: SDUPTHER

## 2019-07-03 DIAGNOSIS — I10 HTN (HYPERTENSION), BENIGN: Chronic | ICD-10-CM

## 2019-07-03 RX ORDER — LISINOPRIL 40 MG/1
TABLET ORAL
Qty: 30 TABLET | Refills: 0 | Status: SHIPPED | OUTPATIENT
Start: 2019-07-03 | End: 2019-07-08 | Stop reason: SDUPTHER

## 2019-07-08 ENCOUNTER — OFFICE VISIT (OUTPATIENT)
Dept: FAMILY MEDICINE CLINIC | Age: 59
End: 2019-07-08
Payer: COMMERCIAL

## 2019-07-08 VITALS
SYSTOLIC BLOOD PRESSURE: 118 MMHG | WEIGHT: 315 LBS | RESPIRATION RATE: 21 BRPM | HEART RATE: 77 BPM | DIASTOLIC BLOOD PRESSURE: 68 MMHG | BODY MASS INDEX: 50.62 KG/M2 | OXYGEN SATURATION: 92 % | HEIGHT: 66 IN

## 2019-07-08 DIAGNOSIS — R42 VERTIGO: ICD-10-CM

## 2019-07-08 DIAGNOSIS — I49.3 FREQUENT PVCS: Chronic | ICD-10-CM

## 2019-07-08 DIAGNOSIS — R79.83 HOMOCYSTEINEMIA: ICD-10-CM

## 2019-07-08 DIAGNOSIS — E79.0 ELEVATED URIC ACID IN BLOOD: Chronic | ICD-10-CM

## 2019-07-08 DIAGNOSIS — H69.81 DYSFUNCTION OF RIGHT EUSTACHIAN TUBE: ICD-10-CM

## 2019-07-08 DIAGNOSIS — E11.65 UNCONTROLLED TYPE 2 DIABETES MELLITUS WITH HYPERGLYCEMIA (HCC): Primary | Chronic | ICD-10-CM

## 2019-07-08 DIAGNOSIS — N18.2 CHRONIC KIDNEY DISEASE, STAGE II (MILD): Chronic | ICD-10-CM

## 2019-07-08 DIAGNOSIS — I10 HTN (HYPERTENSION), BENIGN: Chronic | ICD-10-CM

## 2019-07-08 DIAGNOSIS — E78.2 HYPERLIPIDEMIA, MIXED: Chronic | ICD-10-CM

## 2019-07-08 LAB
ANION GAP SERPL CALCULATED.3IONS-SCNC: 14 MMOL/L (ref 3–16)
BUN BLDV-MCNC: 22 MG/DL (ref 7–20)
CALCIUM SERPL-MCNC: 10.5 MG/DL (ref 8.3–10.6)
CHLORIDE BLD-SCNC: 97 MMOL/L (ref 99–110)
CO2: 28 MMOL/L (ref 21–32)
CREAT SERPL-MCNC: 1.4 MG/DL (ref 0.9–1.3)
GFR AFRICAN AMERICAN: >60
GFR NON-AFRICAN AMERICAN: 52
GLUCOSE BLD-MCNC: 128 MG/DL (ref 70–99)
HBA1C MFR BLD: 7.3 %
POTASSIUM SERPL-SCNC: 4.8 MMOL/L (ref 3.5–5.1)
SODIUM BLD-SCNC: 139 MMOL/L (ref 136–145)

## 2019-07-08 PROCEDURE — 3045F PR MOST RECENT HEMOGLOBIN A1C LEVEL 7.0-9.0%: CPT | Performed by: FAMILY MEDICINE

## 2019-07-08 PROCEDURE — 3017F COLORECTAL CA SCREEN DOC REV: CPT | Performed by: FAMILY MEDICINE

## 2019-07-08 PROCEDURE — 83036 HEMOGLOBIN GLYCOSYLATED A1C: CPT | Performed by: FAMILY MEDICINE

## 2019-07-08 PROCEDURE — 36415 COLL VENOUS BLD VENIPUNCTURE: CPT | Performed by: FAMILY MEDICINE

## 2019-07-08 PROCEDURE — G8427 DOCREV CUR MEDS BY ELIG CLIN: HCPCS | Performed by: FAMILY MEDICINE

## 2019-07-08 PROCEDURE — 2022F DILAT RTA XM EVC RTNOPTHY: CPT | Performed by: FAMILY MEDICINE

## 2019-07-08 PROCEDURE — 1036F TOBACCO NON-USER: CPT | Performed by: FAMILY MEDICINE

## 2019-07-08 PROCEDURE — 99215 OFFICE O/P EST HI 40 MIN: CPT | Performed by: FAMILY MEDICINE

## 2019-07-08 PROCEDURE — G8417 CALC BMI ABV UP PARAM F/U: HCPCS | Performed by: FAMILY MEDICINE

## 2019-07-08 RX ORDER — SIMVASTATIN 80 MG
80 TABLET ORAL NIGHTLY
Qty: 90 TABLET | Refills: 0 | Status: SHIPPED | OUTPATIENT
Start: 2019-07-08 | End: 2019-11-11 | Stop reason: SDUPTHER

## 2019-07-08 RX ORDER — DICLOFENAC SODIUM 75 MG/1
TABLET, DELAYED RELEASE ORAL
Qty: 180 TABLET | Refills: 0 | OUTPATIENT
Start: 2019-07-08

## 2019-07-08 RX ORDER — FOLIC ACID 1 MG/1
TABLET ORAL
Qty: 90 TABLET | Refills: 3 | Status: SHIPPED | OUTPATIENT
Start: 2019-07-08 | End: 2019-11-11 | Stop reason: SDUPTHER

## 2019-07-08 RX ORDER — DICLOFENAC SODIUM 75 MG/1
TABLET, DELAYED RELEASE ORAL
Qty: 180 TABLET | Refills: 0 | Status: CANCELLED | OUTPATIENT
Start: 2019-07-08

## 2019-07-08 RX ORDER — LISINOPRIL 40 MG/1
TABLET ORAL
Qty: 90 TABLET | Refills: 0 | Status: SHIPPED | OUTPATIENT
Start: 2019-07-08 | End: 2019-10-18 | Stop reason: SDUPTHER

## 2019-07-08 SDOH — ECONOMIC STABILITY: INCOME INSECURITY: HOW HARD IS IT FOR YOU TO PAY FOR THE VERY BASICS LIKE FOOD, HOUSING, MEDICAL CARE, AND HEATING?: NOT HARD AT ALL

## 2019-07-08 SDOH — ECONOMIC STABILITY: FOOD INSECURITY: WITHIN THE PAST 12 MONTHS, THE FOOD YOU BOUGHT JUST DIDN'T LAST AND YOU DIDN'T HAVE MONEY TO GET MORE.: NEVER TRUE

## 2019-07-08 SDOH — ECONOMIC STABILITY: FOOD INSECURITY: WITHIN THE PAST 12 MONTHS, YOU WORRIED THAT YOUR FOOD WOULD RUN OUT BEFORE YOU GOT MONEY TO BUY MORE.: NEVER TRUE

## 2019-07-08 SDOH — ECONOMIC STABILITY: TRANSPORTATION INSECURITY
IN THE PAST 12 MONTHS, HAS THE LACK OF TRANSPORTATION KEPT YOU FROM MEDICAL APPOINTMENTS OR FROM GETTING MEDICATIONS?: NO

## 2019-07-08 SDOH — ECONOMIC STABILITY: TRANSPORTATION INSECURITY
IN THE PAST 12 MONTHS, HAS LACK OF TRANSPORTATION KEPT YOU FROM MEETINGS, WORK, OR FROM GETTING THINGS NEEDED FOR DAILY LIVING?: NO

## 2019-07-08 ASSESSMENT — ENCOUNTER SYMPTOMS
RHINORRHEA: 1
SORE THROAT: 0
SINUS PAIN: 1
SINUS PRESSURE: 1

## 2019-07-08 NOTE — PATIENT INSTRUCTIONS
and weight loss help also. These can also help raise the     Vertigo  Dramamine 12.5 OR 25 mg for severe dizziness. Dysfunction of right eustachian tube  Gargle with mouthwash after meals and bedtime with head tilted back and to each side. Hold onto the sink/countertop while gargling due to possible worsening of the vertigo when tilting head. Go to am and bedtime when symptoms resolve for prevention. Vitamin C 500 mg twice daily till resolves then taper down to 250 mg daily. Moist heat where hurting. Sinuses  Claritin 10 mg daily. Flonase over the counter if not better.

## 2019-07-30 ENCOUNTER — TELEPHONE (OUTPATIENT)
Dept: FAMILY MEDICINE CLINIC | Age: 59
End: 2019-07-30

## 2019-08-05 ENCOUNTER — OFFICE VISIT (OUTPATIENT)
Dept: ENT CLINIC | Age: 59
End: 2019-08-05
Payer: COMMERCIAL

## 2019-08-05 VITALS — OXYGEN SATURATION: 93 % | HEART RATE: 84 BPM | SYSTOLIC BLOOD PRESSURE: 144 MMHG | DIASTOLIC BLOOD PRESSURE: 76 MMHG

## 2019-08-05 DIAGNOSIS — H81.02 LABYRINTHINE VERTIGO WITH INVOLVEMENT OF LEFT INNER EAR: Primary | ICD-10-CM

## 2019-08-05 PROCEDURE — G8427 DOCREV CUR MEDS BY ELIG CLIN: HCPCS | Performed by: OTOLARYNGOLOGY

## 2019-08-05 PROCEDURE — 1036F TOBACCO NON-USER: CPT | Performed by: OTOLARYNGOLOGY

## 2019-08-05 PROCEDURE — 3017F COLORECTAL CA SCREEN DOC REV: CPT | Performed by: OTOLARYNGOLOGY

## 2019-08-05 PROCEDURE — 99204 OFFICE O/P NEW MOD 45 MIN: CPT | Performed by: OTOLARYNGOLOGY

## 2019-08-05 PROCEDURE — G8417 CALC BMI ABV UP PARAM F/U: HCPCS | Performed by: OTOLARYNGOLOGY

## 2019-08-05 ASSESSMENT — ENCOUNTER SYMPTOMS
RHINORRHEA: 0
SINUS PRESSURE: 1
APNEA: 1
FACIAL SWELLING: 0
SORE THROAT: 0
SINUS PAIN: 0
ALLERGIC/IMMUNOLOGIC NEGATIVE: 1
VOICE CHANGE: 0
EYES NEGATIVE: 1
TROUBLE SWALLOWING: 0

## 2019-08-19 ENCOUNTER — PROCEDURE VISIT (OUTPATIENT)
Dept: AUDIOLOGY | Age: 59
End: 2019-08-19
Payer: COMMERCIAL

## 2019-08-19 DIAGNOSIS — H90.3 SENSORINEURAL HEARING LOSS (SNHL) OF BOTH EARS: Primary | ICD-10-CM

## 2019-08-19 DIAGNOSIS — R42 VERTIGO: ICD-10-CM

## 2019-08-19 PROCEDURE — 92557 COMPREHENSIVE HEARING TEST: CPT | Performed by: AUDIOLOGIST

## 2019-08-19 PROCEDURE — 92567 TYMPANOMETRY: CPT | Performed by: AUDIOLOGIST

## 2019-08-19 PROCEDURE — 92540 BASIC VESTIBULAR EVALUATION: CPT | Performed by: AUDIOLOGIST

## 2019-08-19 PROCEDURE — 92537 CALORIC VSTBLR TEST W/REC: CPT | Performed by: AUDIOLOGIST

## 2019-08-19 NOTE — PROGRESS NOTES
2019     Nathaniel Mancia (:  1960) is a 62 y.o. male, here for evaluation of the following medical concerns: episode of vertigo. Patient however reports no recent episodes, since initial one. Vestibular testing is normal. Negative for central or peripheral contribution. Re: follow medical regimens.     --Kade Bauman on 2019 at 11:50 AM

## 2019-08-20 ENCOUNTER — TELEPHONE (OUTPATIENT)
Dept: ENT CLINIC | Age: 59
End: 2019-08-20

## 2019-08-20 DIAGNOSIS — H90.5 HEARING LOSS, SENSORINEURAL, UNILATERAL: Primary | ICD-10-CM

## 2019-08-26 ENCOUNTER — HOSPITAL ENCOUNTER (OUTPATIENT)
Age: 59
Discharge: HOME | End: 2019-08-26
Payer: COMMERCIAL

## 2019-08-26 DIAGNOSIS — E11.65 UNCONTROLLED TYPE 2 DIABETES MELLITUS WITH HYPERGLYCEMIA (HCC): Chronic | ICD-10-CM

## 2019-08-26 DIAGNOSIS — I10 HTN (HYPERTENSION), BENIGN: Chronic | ICD-10-CM

## 2019-08-26 DIAGNOSIS — N18.2 CHRONIC KIDNEY DISEASE, STAGE II (MILD): Chronic | ICD-10-CM

## 2019-08-26 LAB
ANION GAP SERPL CALCULATED.3IONS-SCNC: 10 MMOL/L (ref 3–16)
BUN BLDV-MCNC: 12 MG/DL (ref 7–20)
CALCIUM SERPL-MCNC: 9.8 MG/DL (ref 8.3–10.6)
CHLORIDE BLD-SCNC: 104 MMOL/L (ref 99–110)
CO2: 29 MMOL/L (ref 21–32)
CREAT SERPL-MCNC: 1 MG/DL (ref 0.9–1.3)
GFR AFRICAN AMERICAN: >60
GFR NON-AFRICAN AMERICAN: >60
GLUCOSE BLD-MCNC: 129 MG/DL (ref 70–99)
POTASSIUM SERPL-SCNC: 4.5 MMOL/L (ref 3.5–5.1)
SODIUM BLD-SCNC: 143 MMOL/L (ref 136–145)

## 2019-08-26 PROCEDURE — 80048 BASIC METABOLIC PNL TOTAL CA: CPT

## 2019-08-26 PROCEDURE — 36415 COLL VENOUS BLD VENIPUNCTURE: CPT

## 2019-08-27 ENCOUNTER — HOSPITAL ENCOUNTER (OUTPATIENT)
Dept: MRI IMAGING | Age: 59
Discharge: HOME | End: 2019-08-27
Payer: COMMERCIAL

## 2019-08-27 DIAGNOSIS — H90.5 HEARING LOSS, SENSORINEURAL, UNILATERAL: ICD-10-CM

## 2019-08-27 PROCEDURE — 70553 MRI BRAIN STEM W/O & W/DYE: CPT

## 2019-08-27 PROCEDURE — A9579 GAD-BASE MR CONTRAST NOS,1ML: HCPCS | Performed by: OTOLARYNGOLOGY

## 2019-08-27 PROCEDURE — 6360000004 HC RX CONTRAST MEDICATION: Performed by: OTOLARYNGOLOGY

## 2019-08-27 RX ADMIN — GADOTERIDOL 30 ML: 279.3 INJECTION, SOLUTION INTRAVENOUS at 14:45

## 2019-08-28 ENCOUNTER — TELEPHONE (OUTPATIENT)
Dept: ENT CLINIC | Age: 59
End: 2019-08-28

## 2019-08-28 DIAGNOSIS — H81.09 LABYRINTHINE VERTIGO, UNSPECIFIED LATERALITY: Primary | ICD-10-CM

## 2019-08-28 RX ORDER — ALPRAZOLAM 0.25 MG/1
0.25 TABLET ORAL 2 TIMES DAILY
Qty: 60 TABLET | Refills: 0 | OUTPATIENT
Start: 2019-08-28 | End: 2019-09-27

## 2019-08-28 NOTE — TELEPHONE ENCOUNTER
[phone] (home)   Call to PT to discuss MRI results. Pt reports some more episodes of vertigo and is requesting something for this.   Please advise

## 2019-09-10 ENCOUNTER — HOSPITAL ENCOUNTER (EMERGENCY)
Age: 59
Discharge: HOME | End: 2019-09-10
Payer: COMMERCIAL

## 2019-09-10 VITALS
TEMPERATURE: 97.5 F | BODY MASS INDEX: 52.48 KG/M2 | HEIGHT: 65 IN | OXYGEN SATURATION: 90 % | SYSTOLIC BLOOD PRESSURE: 142 MMHG | WEIGHT: 315 LBS | HEART RATE: 57 BPM | DIASTOLIC BLOOD PRESSURE: 77 MMHG | RESPIRATION RATE: 16 BRPM

## 2019-09-10 DIAGNOSIS — L02.212 ABSCESS OF BACK: Primary | ICD-10-CM

## 2019-09-10 PROCEDURE — 99282 EMERGENCY DEPT VISIT SF MDM: CPT

## 2019-09-10 PROCEDURE — 4500000022 HC ED LEVEL 2 PROCEDURE

## 2019-09-10 RX ORDER — CEPHALEXIN 500 MG/1
500 CAPSULE ORAL 4 TIMES DAILY
Qty: 40 CAPSULE | Refills: 0 | Status: SHIPPED | OUTPATIENT
Start: 2019-09-10 | End: 2019-09-20

## 2019-09-10 RX ORDER — SULFAMETHOXAZOLE AND TRIMETHOPRIM 800; 160 MG/1; MG/1
1 TABLET ORAL 2 TIMES DAILY
Qty: 20 TABLET | Refills: 0 | Status: SHIPPED | OUTPATIENT
Start: 2019-09-10 | End: 2019-09-20

## 2019-09-10 ASSESSMENT — ENCOUNTER SYMPTOMS
SHORTNESS OF BREATH: 0
VOMITING: 0
COLOR CHANGE: 1
CHEST TIGHTNESS: 0
ABDOMINAL PAIN: 0
DIARRHEA: 0
NAUSEA: 0

## 2019-09-10 ASSESSMENT — PAIN SCALES - GENERAL: PAINLEVEL_OUTOF10: 7

## 2019-09-11 NOTE — ED PROVIDER NOTES
negatives as per HPI. Except as noted abovein the ROS, all other systems were reviewed and negative. 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 Moderate bilateral foraminal narrowing L2-L3, L3-L4 and L4-L5 along with moderate right and mild left foraminal narrowing L5-S1    DM (diabetes mellitus), type 2, uncontrolled ([address] 8/1/2011    ED (erectile dysfunction)     Edema of both legs     Homocysteinemia ([facility] 8/13/2012    22.79    HTN (hypertension), benign     Hyperlipidemia, mixed     Impaired fasting glucose 8/26/2008    Microalbuminuria 8/3/2009    Morbid obesity (HCC)     w/ fam hx of DM2 BMI 59    BEN on CPAP     Primary osteoarthritis of both hips 3/19/2018    Mild right greater than left osteoarthritis    Umbilical hernia     Vitamin D deficiency 8/1/2011         SURGICAL HISTORY     Past Surgical History:   Procedure Laterality Date    COLONOSCOPY  2009    No Polyps    COLONOSCOPY N/A 10/22/2018    COLONOSCOPY POLYPECTOMY SNARE/COLD BIOPSY performed by Isak Beltre MD at [facility] [address]    KNEE SURGERY  2001    R, arthroscopic         CURRENTMEDICATIONS       Previous Medications    ALPRAZOLAM (XANAX) 0.25 MG TABLET    Take 1 tablet by mouth 2 times daily for 30 days. CHOLECALCIFEROL (VITAMIN D3) 5000 UNITS CAPS    Take 1 capsule by mouth daily.     Indications: Vitamin D Deficiency    DAPAGLIFLOZIN (FARXIGA) 10 MG TABLET    TAKE 1 TABLET BY MOUTH EVERY MORNING    DICLOFENAC (VOLTAREN) 75 MG EC TABLET    TAKE 1 TABLET BY MOUTH TWICE A DAY    FOLIC ACID (FOLVITE) 1 MG rate and regular rhythm. Exam reveals no friction rub. No murmur heard. Pulmonary/Chest: Effort normal and breath sounds normal. No stridor. No respiratory distress. He has no wheezes. Abdominal: Soft. He exhibits no mass. There is no tenderness. Musculoskeletal: Normal range of motion. Neurological: He is alert and oriented to person, place, and time. Skin: Skin is warm and dry. He is not diaphoretic. No pallor. Psychiatric: He has a normal mood and affect. His behavior is normal.   Nursing note and vitals reviewed. DIAGNOSTIC RESULTS   LABS:    Labs Reviewed - No data to display    All other labs were within normal range or not returned as of this dictation. EKG: All EKG's are interpreted by the Emergency Department Physician in the absence of a cardiologist.  Please see their note for interpretation of EKG. RADIOLOGY:   Non-plain film images such as CT, Ultrasound and MRI are read by the radiologist. Plain radiographic images are visualized andpreliminarily interpreted by the  ED Provider with the below findings:        Interpretation perthe Radiologist below, if available at the time of this note:    No orders to display     No results found. PROCEDURES   Unless otherwise noted below, none     Incision/Drainage  Date/Time: 9/10/2019 11:39 PM  Performed by: JOHN Dalton CNP  Authorized by: JOHN Dalton CNP     Consent:     Consent obtained:  Verbal    Consent given by:  Patient    Risks discussed:  Bleeding, incomplete drainage, pain, infection and damage to other organs  Location:     Type:  Abscess    Size:  2    Location:  Trunk    Trunk location:  Back  Anesthesia (see MAR for exact dosages):      Anesthesia method:  Local infiltration    Local anesthetic:  Lidocaine 1% w/o epi  Procedure type:     Complexity:  Complex  Procedure details:     Incision types:  Single straight    Scalpel blade:  11    Wound management:  Probed and deloculated and

## 2019-10-18 DIAGNOSIS — I10 HTN (HYPERTENSION), BENIGN: Chronic | ICD-10-CM

## 2019-10-18 RX ORDER — LISINOPRIL 40 MG/1
TABLET ORAL
Qty: 15 TABLET | Refills: 0 | Status: SHIPPED | OUTPATIENT
Start: 2019-10-18 | End: 2019-11-11 | Stop reason: SDUPTHER

## 2019-11-11 ENCOUNTER — OFFICE VISIT (OUTPATIENT)
Dept: FAMILY MEDICINE CLINIC | Age: 59
End: 2019-11-11
Payer: COMMERCIAL

## 2019-11-11 VITALS
DIASTOLIC BLOOD PRESSURE: 82 MMHG | RESPIRATION RATE: 16 BRPM | HEART RATE: 84 BPM | BODY MASS INDEX: 52.48 KG/M2 | HEIGHT: 65 IN | WEIGHT: 315 LBS | SYSTOLIC BLOOD PRESSURE: 136 MMHG | OXYGEN SATURATION: 97 %

## 2019-11-11 DIAGNOSIS — E79.0 ELEVATED URIC ACID IN BLOOD: Chronic | ICD-10-CM

## 2019-11-11 DIAGNOSIS — E78.2 HYPERLIPIDEMIA, MIXED: Chronic | ICD-10-CM

## 2019-11-11 DIAGNOSIS — Z51.81 MEDICATION MONITORING ENCOUNTER: ICD-10-CM

## 2019-11-11 DIAGNOSIS — I10 HTN (HYPERTENSION), BENIGN: Chronic | ICD-10-CM

## 2019-11-11 DIAGNOSIS — E11.65 UNCONTROLLED TYPE 2 DIABETES MELLITUS WITH HYPERGLYCEMIA (HCC): Primary | ICD-10-CM

## 2019-11-11 DIAGNOSIS — R79.83 HOMOCYSTEINEMIA: ICD-10-CM

## 2019-11-11 DIAGNOSIS — N18.2 CHRONIC KIDNEY DISEASE, STAGE II (MILD): Chronic | ICD-10-CM

## 2019-11-11 LAB
ANION GAP SERPL CALCULATED.3IONS-SCNC: 16 MMOL/L (ref 3–16)
BUN BLDV-MCNC: 24 MG/DL (ref 7–20)
CALCIUM SERPL-MCNC: 10.1 MG/DL (ref 8.3–10.6)
CHLORIDE BLD-SCNC: 104 MMOL/L (ref 99–110)
CO2: 26 MMOL/L (ref 21–32)
CREAT SERPL-MCNC: 1.5 MG/DL (ref 0.9–1.3)
GFR AFRICAN AMERICAN: 58
GFR NON-AFRICAN AMERICAN: 48
GLUCOSE BLD-MCNC: 122 MG/DL (ref 70–99)
HBA1C MFR BLD: 7.3 %
MAGNESIUM: 2.5 MG/DL (ref 1.8–2.4)
POTASSIUM SERPL-SCNC: 4.5 MMOL/L (ref 3.5–5.1)
SODIUM BLD-SCNC: 146 MMOL/L (ref 136–145)
URIC ACID, SERUM: 8.2 MG/DL (ref 3.5–7.2)

## 2019-11-11 PROCEDURE — 99214 OFFICE O/P EST MOD 30 MIN: CPT | Performed by: FAMILY MEDICINE

## 2019-11-11 PROCEDURE — 83036 HEMOGLOBIN GLYCOSYLATED A1C: CPT | Performed by: FAMILY MEDICINE

## 2019-11-11 PROCEDURE — 3051F HG A1C>EQUAL 7.0%<8.0%: CPT | Performed by: FAMILY MEDICINE

## 2019-11-11 PROCEDURE — 36415 COLL VENOUS BLD VENIPUNCTURE: CPT | Performed by: FAMILY MEDICINE

## 2019-11-11 PROCEDURE — G8484 FLU IMMUNIZE NO ADMIN: HCPCS | Performed by: FAMILY MEDICINE

## 2019-11-11 PROCEDURE — G8427 DOCREV CUR MEDS BY ELIG CLIN: HCPCS | Performed by: FAMILY MEDICINE

## 2019-11-11 PROCEDURE — 2022F DILAT RTA XM EVC RTNOPTHY: CPT | Performed by: FAMILY MEDICINE

## 2019-11-11 PROCEDURE — 3017F COLORECTAL CA SCREEN DOC REV: CPT | Performed by: FAMILY MEDICINE

## 2019-11-11 PROCEDURE — G8417 CALC BMI ABV UP PARAM F/U: HCPCS | Performed by: FAMILY MEDICINE

## 2019-11-11 PROCEDURE — 1036F TOBACCO NON-USER: CPT | Performed by: FAMILY MEDICINE

## 2019-11-11 RX ORDER — LISINOPRIL 40 MG/1
TABLET ORAL
Qty: 90 TABLET | Refills: 0 | Status: SHIPPED | OUTPATIENT
Start: 2019-11-11 | End: 2019-11-14 | Stop reason: SDUPTHER

## 2019-11-11 RX ORDER — DICLOFENAC SODIUM 75 MG/1
TABLET, DELAYED RELEASE ORAL
Qty: 180 TABLET | Refills: 0 | Status: SHIPPED | OUTPATIENT
Start: 2019-11-11

## 2019-11-11 RX ORDER — SIMVASTATIN 80 MG
80 TABLET ORAL NIGHTLY
Qty: 90 TABLET | Refills: 1 | Status: SHIPPED | OUTPATIENT
Start: 2019-11-11

## 2019-11-11 RX ORDER — METFORMIN HYDROCHLORIDE 750 MG/1
TABLET, EXTENDED RELEASE ORAL
Qty: 180 TABLET | Refills: 0 | Status: SHIPPED | OUTPATIENT
Start: 2019-11-11 | End: 2020-01-20

## 2019-11-11 RX ORDER — FOLIC ACID 1 MG/1
TABLET ORAL
Qty: 90 TABLET | Refills: 3 | Status: SHIPPED | OUTPATIENT
Start: 2019-11-11

## 2019-11-11 RX ORDER — TRIAMTERENE AND HYDROCHLOROTHIAZIDE 37.5; 25 MG/1; MG/1
1 TABLET ORAL DAILY
Qty: 90 TABLET | Refills: 0 | Status: SHIPPED | OUTPATIENT
Start: 2019-11-11

## 2019-11-11 ASSESSMENT — ENCOUNTER SYMPTOMS
COUGH: 0
WHEEZING: 0
CHEST TIGHTNESS: 0
CHOKING: 0
SHORTNESS OF BREATH: 0

## 2019-11-14 DIAGNOSIS — I10 HTN (HYPERTENSION), BENIGN: Chronic | ICD-10-CM

## 2019-11-14 RX ORDER — LISINOPRIL 40 MG/1
TABLET ORAL
Qty: 90 TABLET | Refills: 0 | Status: SHIPPED | OUTPATIENT
Start: 2019-11-14

## 2020-01-20 RX ORDER — METFORMIN HYDROCHLORIDE 750 MG/1
TABLET, EXTENDED RELEASE ORAL
Qty: 90 TABLET | Refills: 0 | Status: SHIPPED | OUTPATIENT
Start: 2020-01-20

## 2020-11-09 RX ORDER — LISINOPRIL 40 MG/1
TABLET ORAL
Qty: 90 TABLET | Refills: 0 | OUTPATIENT
Start: 2020-11-09

## 2020-12-28 RX ORDER — FOLIC ACID 1 MG/1
TABLET ORAL
Qty: 90 TABLET | Refills: 3 | OUTPATIENT
Start: 2020-12-28

## 2021-01-13 DIAGNOSIS — I10 HTN (HYPERTENSION), BENIGN: Chronic | ICD-10-CM

## 2021-01-13 RX ORDER — LISINOPRIL 40 MG/1
TABLET ORAL
Qty: 90 TABLET | Refills: 0 | OUTPATIENT
Start: 2021-01-13

## 2021-01-18 DIAGNOSIS — R79.83 HOMOCYSTEINEMIA: ICD-10-CM

## 2021-01-18 DIAGNOSIS — E78.2 HYPERLIPIDEMIA, MIXED: Chronic | ICD-10-CM

## 2021-01-19 RX ORDER — FOLIC ACID 1 MG/1
TABLET ORAL
Qty: 90 TABLET | Refills: 3 | OUTPATIENT
Start: 2021-01-19

## (undated) DEVICE — PROCEDURE KIT ENDOSCP CUST

## (undated) DEVICE — ELECTRODE PT RET AD L9FT HI MOIST COND ADH HYDRGEL CORDED

## (undated) DEVICE — TRAP SPEC RETRV CLR PLAS POLYP IN LN SUCT QUIK CTCH

## (undated) DEVICE — YANKAUER SUCTION INSTRUMENT NO CONTROL VENT, BULB TIP, CLEAR: Brand: YANKAUER

## (undated) DEVICE — 60 ML SYRINGE,REGULAR TIP: Brand: MONOJECT

## (undated) DEVICE — BW-412T DISP COMBO CLEANING BRUSH: Brand: SINGLE USE COMBINATION CLEANING BRUSH

## (undated) DEVICE — Device: Brand: DISPOSABLE ELECTROSURGICAL SNARE

## (undated) DEVICE — MEDI-VAC NON-CONDUCTIVE SUCTION TUBING: Brand: CARDINAL HEALTH

## (undated) DEVICE — SOLUTION IV IRRIG WATER 500ML POUR BRL ST 2F7113